# Patient Record
Sex: MALE | Race: OTHER | Employment: OTHER | URBAN - METROPOLITAN AREA
[De-identification: names, ages, dates, MRNs, and addresses within clinical notes are randomized per-mention and may not be internally consistent; named-entity substitution may affect disease eponyms.]

---

## 2023-07-11 LAB
ALANINE AMINOTRANSFERASE (SGPT) (U/L) IN SER/PLAS: 13 U/L (ref 10–52)
ALBUMIN (G/DL) IN SER/PLAS: 4.3 G/DL (ref 3.4–5)
ALKALINE PHOSPHATASE (U/L) IN SER/PLAS: 52 U/L (ref 33–120)
ANION GAP IN SER/PLAS: 12 MMOL/L (ref 10–20)
ASPARTATE AMINOTRANSFERASE (SGOT) (U/L) IN SER/PLAS: 19 U/L (ref 9–39)
BASOPHILS (10*3/UL) IN BLOOD BY MANUAL COUNT - WAM: 0 X10E9/L (ref 0–0.1)
BASOPHILS/100 LEUKOCYTES IN BLOOD BY MANUAL COUNT - WAM: 0 % (ref 0–2)
BILIRUBIN TOTAL (MG/DL) IN SER/PLAS: 0.6 MG/DL (ref 0–1.2)
CALCIDIOL (25 OH VITAMIN D3) (NG/ML) IN SER/PLAS: 30 NG/ML
CALCIUM (MG/DL) IN SER/PLAS: 9.5 MG/DL (ref 8.6–10.6)
CARBON DIOXIDE, TOTAL (MMOL/L) IN SER/PLAS: 27 MMOL/L (ref 21–32)
CD3+CD4+ ABSOLUTE: 0.6 X10E9/L (ref 0.35–2.74)
CD3+CD8+ ABSOLUTE: 0.69 X10E9/L (ref 0.08–1.49)
CD4/CD8 RATIO: 0.87 (ref 1–3.5)
CD45%: 100 %
CHLORIDE (MMOL/L) IN SER/PLAS: 104 MMOL/L (ref 98–107)
CHOLESTEROL (MG/DL) IN SER/PLAS: 202 MG/DL (ref 0–199)
CHOLESTEROL IN HDL (MG/DL) IN SER/PLAS: 40.1 MG/DL
CHOLESTEROL/HDL RATIO: 5
CP CD3+CD4+%: 33 % (ref 29–57)
CP CD3+CD8+%: 38 % (ref 7–31)
CREATININE (MG/DL) IN SER/PLAS: 1.27 MG/DL (ref 0.5–1.3)
EOSINOPHILS (10*3/UL) IN BLOOD BY MANUAL COUNT - WAM: 0.18 X10E9/L (ref 0–0.7)
EOSINOPHILS/100 LEUKOCYTES IN BLOOD BY MANUAL COUNT - WAM: 3.5 % (ref 0–6)
ERYTHROCYTE DISTRIBUTION WIDTH (RATIO) BY AUTOMATED COUNT: 14 % (ref 11.5–14.5)
ERYTHROCYTE MEAN CORPUSCULAR HEMOGLOBIN CONCENTRATION (G/DL) BY AUTOMATED: 33.1 G/DL (ref 32–36)
ERYTHROCYTE MEAN CORPUSCULAR VOLUME (FL) BY AUTOMATED COUNT: 94 FL (ref 80–100)
ERYTHROCYTES (10*6/UL) IN BLOOD BY AUTOMATED COUNT: 4.39 X10E12/L (ref 4.5–5.9)
FMETH: ABNORMAL
FSIT1: ABNORMAL
GFR MALE: 66 ML/MIN/1.73M2
GLUCOSE (MG/DL) IN SER/PLAS: 63 MG/DL (ref 74–99)
HEMATOCRIT (%) IN BLOOD BY AUTOMATED COUNT: 41.4 % (ref 41–52)
HEMOGLOBIN (G/DL) IN BLOOD: 13.7 G/DL (ref 13.5–17.5)
IMMATURE GRANULOCYTES/100 LEUKOCYTES IN BLOOD BY AUTOMATED COUNT: 0 % (ref 0–0.9)
LDL: 147 MG/DL (ref 0–99)
LEUKOCYTES (10*3/UL) IN BLOOD BY AUTOMATED COUNT: 5.1 X10E9/L (ref 4.4–11.3)
LYMPHOCYTES (10*3/UL) IN BLOOD BY MANUAL COUNT - WAM: 1.77 X10E9/L (ref 1.2–4.8)
LYMPHOCYTES VARIANT/100 LEUKOCYTES IN BLOOD - WAM: 0.9 % (ref 0–2)
LYMPHOCYTES/100 LEUKOCYTES IN BLOOD BY MANUAL COUNT - WAM: 34.8 % (ref 13–44)
MANUAL DIFFERENTIAL Y/N: ABNORMAL
MONOCYTES (10*3/UL) IN BLOOD BY MANUAL COUNT - WAM: 0.27 X10E9/L (ref 0.1–1)
MONOCYTES/100 LEUKOCYTES IN BLOOD BY MANUAL COUNT - WAM: 5.2 % (ref 2–10)
NEUTROPHILS (SEGS+BANDS) (10*3/UL) MANUAL COUNT - WAM: 2.84 X10E9/L (ref 1.2–7.7)
NRBC (PER 100 WBCS) BY AUTOMATED COUNT: 0 /100 WBC (ref 0–0)
PLATELET CLUMP (PRESENCE) IN BLOOD BY LIGHT MICROSCOPY: PRESENT
PLATELETS (10*3/UL) IN BLOOD AUTOMATED COUNT: 249 X10E9/L (ref 150–450)
POTASSIUM (MMOL/L) IN SER/PLAS: 4.5 MMOL/L (ref 3.5–5.3)
PROTEIN TOTAL: 7.6 G/DL (ref 6.4–8.2)
RBC MORPHOLOGY IN BLOOD: NORMAL
SEGMENTED NEUTROPHILS (10*3/UL) BLOOD MANUAL - WAM: 2.84 X10E9/L (ref 1.2–7)
SEGMENTED NEUTROPHILS/100 LEUKOCYTES BY MANUAL COUNT -: 55.6 % (ref 40–80)
SODIUM (MMOL/L) IN SER/PLAS: 138 MMOL/L (ref 136–145)
SYPHILIS TOTAL AB: NONREACTIVE
TRIGLYCERIDE (MG/DL) IN SER/PLAS: 74 MG/DL (ref 0–149)
UREA NITROGEN (MG/DL) IN SER/PLAS: 16 MG/DL (ref 6–23)
VARIANT LYMPHOCYTES (10*3/UL) BLOOD MANUAL COUNT - WAM: 0.05 X10E9/L (ref 0–0.5)
VLDL: 15 MG/DL (ref 0–40)

## 2023-07-12 LAB
CHLAMYDIA TRACH., AMPLIFIED: NEGATIVE
HCV PCR QUANT: NOT DETECTED IU/ML
HCV RNA, PCR LOG: NORMAL LOG10 IU/ML
HIV-1 RNA PCR VIRAL LOAD LOG: NORMAL LOG10 CPY/ML
HIV-1 RNA VIRAL LOAD: NOT DETECTED COPIES/ML
N. GONORRHEA, AMPLIFIED: NEGATIVE

## 2023-10-10 ENCOUNTER — TELEPHONE (OUTPATIENT)
Dept: IMMUNOLOGY | Facility: CLINIC | Age: 58
End: 2023-10-10

## 2023-10-10 NOTE — TELEPHONE ENCOUNTER
Sw attempted to reach pt re: proof of tax extension filling needed for [employer] renewal.  Helen LM for pt.

## 2024-01-09 ENCOUNTER — OFFICE VISIT (OUTPATIENT)
Dept: IMMUNOLOGY | Facility: CLINIC | Age: 59
End: 2024-01-09
Payer: MEDICARE

## 2024-01-09 VITALS
DIASTOLIC BLOOD PRESSURE: 94 MMHG | OXYGEN SATURATION: 100 % | HEART RATE: 75 BPM | HEIGHT: 78 IN | TEMPERATURE: 98.1 F | WEIGHT: 240 LBS | SYSTOLIC BLOOD PRESSURE: 146 MMHG | BODY MASS INDEX: 27.77 KG/M2 | RESPIRATION RATE: 16 BRPM

## 2024-01-09 DIAGNOSIS — B20 HIV DISEASE (MULTI): ICD-10-CM

## 2024-01-09 DIAGNOSIS — B20 HUMAN IMMUNODEFICIENCY VIRUS (HIV) DISEASE (MULTI): ICD-10-CM

## 2024-01-09 LAB
ALBUMIN SERPL BCP-MCNC: 4.6 G/DL (ref 3.4–5)
ALP SERPL-CCNC: 60 U/L (ref 33–120)
ALT SERPL W P-5'-P-CCNC: 16 U/L (ref 10–52)
ANION GAP SERPL CALC-SCNC: 14 MMOL/L (ref 10–20)
AST SERPL W P-5'-P-CCNC: 17 U/L (ref 9–39)
BASOPHILS # BLD AUTO: 0.03 X10*3/UL (ref 0–0.1)
BASOPHILS NFR BLD AUTO: 0.5 %
BILIRUB SERPL-MCNC: 0.5 MG/DL (ref 0–1.2)
BUN SERPL-MCNC: 14 MG/DL (ref 6–23)
CALCIUM SERPL-MCNC: 9.4 MG/DL (ref 8.6–10.6)
CD3+CD4+ CELLS # BLD: 0.51 X10E9/L
CD3+CD4+ CELLS # BLD: 513 /MM3
CD3+CD4+ CELLS NFR BLD: 30 %
CD3+CD4+ CELLS/CD3+CD8+ CLL BLD: 0.77 %
CD3+CD8+ CELLS # BLD: 0.67 X10E9/L
CD3+CD8+ CELLS NFR BLD: 39 %
CHLORIDE SERPL-SCNC: 103 MMOL/L (ref 98–107)
CO2 SERPL-SCNC: 28 MMOL/L (ref 21–32)
CREAT SERPL-MCNC: 1.56 MG/DL (ref 0.5–1.3)
EGFRCR SERPLBLD CKD-EPI 2021: 51 ML/MIN/1.73M*2
EOSINOPHIL # BLD AUTO: 0.05 X10*3/UL (ref 0–0.7)
EOSINOPHIL NFR BLD AUTO: 0.9 %
ERYTHROCYTE [DISTWIDTH] IN BLOOD BY AUTOMATED COUNT: 14.4 % (ref 11.5–14.5)
GLUCOSE SERPL-MCNC: 72 MG/DL (ref 74–99)
HCT VFR BLD AUTO: 44.5 % (ref 41–52)
HGB BLD-MCNC: 14.9 G/DL (ref 13.5–17.5)
IMM GRANULOCYTES # BLD AUTO: 0.01 X10*3/UL (ref 0–0.7)
IMM GRANULOCYTES NFR BLD AUTO: 0.2 % (ref 0–0.9)
LYMPHOCYTES # BLD AUTO: 1.71 X10*3/UL (ref 1.2–4.8)
LYMPHOCYTES # SPEC AUTO: 1.71 X10*3/UL
LYMPHOCYTES NFR BLD AUTO: 29.8 %
MCH RBC QN AUTO: 31.2 PG (ref 26–34)
MCHC RBC AUTO-ENTMCNC: 33.5 G/DL (ref 32–36)
MCV RBC AUTO: 93 FL (ref 80–100)
MONOCYTES # BLD AUTO: 0.44 X10*3/UL (ref 0.1–1)
MONOCYTES NFR BLD AUTO: 7.7 %
NEUTROPHILS # BLD AUTO: 3.5 X10*3/UL (ref 1.2–7.7)
NEUTROPHILS NFR BLD AUTO: 60.9 %
NRBC BLD-RTO: 0 /100 WBCS (ref 0–0)
PLATELET # BLD AUTO: 245 X10*3/UL (ref 150–450)
POTASSIUM SERPL-SCNC: 4.1 MMOL/L (ref 3.5–5.3)
PROT SERPL-MCNC: 7.9 G/DL (ref 6.4–8.2)
RBC # BLD AUTO: 4.78 X10*6/UL (ref 4.5–5.9)
SODIUM SERPL-SCNC: 141 MMOL/L (ref 136–145)
WBC # BLD AUTO: 5.7 X10*3/UL (ref 4.4–11.3)

## 2024-01-09 PROCEDURE — 84075 ASSAY ALKALINE PHOSPHATASE: CPT | Performed by: NURSE PRACTITIONER

## 2024-01-09 PROCEDURE — 87536 HIV-1 QUANT&REVRSE TRNSCRPJ: CPT | Performed by: NURSE PRACTITIONER

## 2024-01-09 PROCEDURE — 1036F TOBACCO NON-USER: CPT | Performed by: NURSE PRACTITIONER

## 2024-01-09 PROCEDURE — 88185 FLOWCYTOMETRY/TC ADD-ON: CPT | Mod: TC | Performed by: NURSE PRACTITIONER

## 2024-01-09 PROCEDURE — 99213 OFFICE O/P EST LOW 20 MIN: CPT | Performed by: NURSE PRACTITIONER

## 2024-01-09 PROCEDURE — 36415 COLL VENOUS BLD VENIPUNCTURE: CPT | Performed by: NURSE PRACTITIONER

## 2024-01-09 PROCEDURE — 85025 COMPLETE CBC W/AUTO DIFF WBC: CPT | Performed by: NURSE PRACTITIONER

## 2024-01-09 RX ORDER — GARLIC 1000 MG
CAPSULE ORAL DAILY
COMMUNITY

## 2024-01-09 RX ORDER — RIBOFLAVIN (VITAMIN B2) 100 MG
1 TABLET ORAL DAILY
COMMUNITY
Start: 2020-12-08

## 2024-01-09 RX ORDER — GLUCOSAM/CHONDRO/HERB 149/HYAL 750-100 MG
TABLET ORAL
COMMUNITY

## 2024-01-09 RX ORDER — BICTEGRAVIR SODIUM, EMTRICITABINE, AND TENOFOVIR ALAFENAMIDE FUMARATE 50; 200; 25 MG/1; MG/1; MG/1
1 TABLET ORAL DAILY
COMMUNITY
Start: 2019-06-11 | End: 2024-01-16 | Stop reason: SDUPTHER

## 2024-01-09 ASSESSMENT — ENCOUNTER SYMPTOMS
ENDOCRINE NEGATIVE: 1
ALLERGIC/IMMUNOLOGIC NEGATIVE: 1
RESPIRATORY NEGATIVE: 1
NEUROLOGICAL NEGATIVE: 1
HEMATOLOGIC/LYMPHATIC NEGATIVE: 1
PSYCHIATRIC NEGATIVE: 1
GASTROINTESTINAL NEGATIVE: 1
CARDIOVASCULAR NEGATIVE: 1
BACK PAIN: 1
CONSTITUTIONAL NEGATIVE: 1

## 2024-01-09 ASSESSMENT — PAIN SCALES - GENERAL: PAINLEVEL: 0-NO PAIN

## 2024-01-09 NOTE — PROGRESS NOTES
"HIV Clinic Follow-up Visit:    Esdras Capone was last seen in [facility] on 10/10/2023    Missed antiretroviral doses in last 72 hours? No    Tobacco use: No     SUBJECTIVE: HIV positive patient in for routine follow up.  Blood pressure elevated here this morning.  We discussed salt intake - using Ms. Dash instead.  He plans to make that change today.    Review of Systems  Review of Systems   Constitutional: Negative.    HENT: Negative.     Respiratory: Negative.     Cardiovascular: Negative.    Gastrointestinal: Negative.    Endocrine: Negative.    Genitourinary: Negative.    Musculoskeletal:  Positive for back pain.   Skin: Negative.    Allergic/Immunologic: Negative.    Neurological: Negative.    Hematological: Negative.    Psychiatric/Behavioral: Negative.         CURRENT MEDICATIONS:    Current Outpatient Medications:     Biktarvy -25 mg tablet, Take 1 tablet by mouth once daily., Disp: , Rfl:     omega 3-dha-epa-fish oil (Fish OiL) 1,000 mg (120 mg-180 mg) capsule, Take by mouth., Disp: , Rfl:     riboflavin (vitamin B2) 100 mg tablet tablet, Take 1 tablet (100 mg) by mouth once daily., Disp: , Rfl:     garlic 1,000 mg capsule, Take by mouth once daily., Disp: , Rfl:     PHYSICAL EXAMINATION:  Visit Vitals  BP (!) 146/94 (BP Location: Right arm, Patient Position: Sitting, BP Cuff Size: Adult)   Pulse 75   Temp 36.7 °C (98.1 °F) (Skin)   Resp 16   Ht 1.981 m (6' 6\")   Wt 109 kg (240 lb)   SpO2 100%   BMI 27.73 kg/m²   Smoking Status Never   BSA 2.45 m²       Physical Exam   Physical Exam  Vitals reviewed.   Constitutional:       Appearance: Normal appearance.   HENT:      Head: Normocephalic.   Cardiovascular:      Rate and Rhythm: Normal rate and regular rhythm.      Heart sounds: Normal heart sounds.   Pulmonary:      Effort: Pulmonary effort is normal.      Breath sounds: Normal breath sounds.   Abdominal:      General: Bowel sounds are normal.      Palpations: Abdomen is soft.   Musculoskeletal:         " General: Normal range of motion.      Cervical back: Normal range of motion and neck supple.   Skin:     General: Skin is warm and dry.   Neurological:      Mental Status: He is alert and oriented to person, place, and time.   Psychiatric:         Mood and Affect: Mood normal.       PERTINENT DATA:  CBC:  WBC   Date Value Ref Range Status   07/11/2023 5.1 4.4 - 11.3 x10E9/L Final     nRBC   Date Value Ref Range Status   07/11/2023 0.0 0.0 - 0.0 /100 WBC Final     RBC   Date Value Ref Range Status   07/11/2023 4.39 (L) 4.50 - 5.90 x10E12/L Final     Hemoglobin   Date Value Ref Range Status   07/11/2023 13.7 13.5 - 17.5 g/dL Final     MCV   Date Value Ref Range Status   07/11/2023 94 80 - 100 fL Final     RDW   Date Value Ref Range Status   07/11/2023 14.0 11.5 - 14.5 % Final       Renal Function Panel:  Glucose   Date Value Ref Range Status   07/11/2023 63 (L) 74 - 99 mg/dL Final     Sodium   Date Value Ref Range Status   07/11/2023 138 136 - 145 mmol/L Final     Potassium   Date Value Ref Range Status   07/11/2023 4.5 3.5 - 5.3 mmol/L Final     Comment:     MILD HEMOLYSIS DETECTED. The result may be falsely elevated due to  hemolysis or other interferents. Clinical correlation is recommended.  Repeat testing may be considered.       Chloride   Date Value Ref Range Status   07/11/2023 104 98 - 107 mmol/L Final     Anion Gap   Date Value Ref Range Status   07/11/2023 12 10 - 20 mmol/L Final     Urea Nitrogen   Date Value Ref Range Status   07/11/2023 16 6 - 23 mg/dL Final     Creatinine   Date Value Ref Range Status   07/11/2023 1.27 0.50 - 1.30 mg/dL Final     Calcium   Date Value Ref Range Status   07/11/2023 9.5 8.6 - 10.6 mg/dL Final     Phosphorus   Date Value Ref Range Status   12/30/2022 2.5 2.5 - 4.9 mg/dL Final     Comment:      The performance characteristics of phosphorus testing in   heparinized plasma have been validated by the individual     laboratory site where testing is performed. Testing    on  "heparinized plasma is not approved by the FDA;    however, such approval is not necessary.       Albumin   Date Value Ref Range Status   07/11/2023 4.3 3.4 - 5.0 g/dL Final       Hepatic Panel:  Albumin   Date Value Ref Range Status   07/11/2023 4.3 3.4 - 5.0 g/dL Final     Total Bilirubin   Date Value Ref Range Status   07/11/2023 0.6 0.0 - 1.2 mg/dL Final     Alkaline Phosphatase   Date Value Ref Range Status   07/11/2023 52 33 - 120 U/L Final     ALT (SGPT)   Date Value Ref Range Status   07/11/2023 13 10 - 52 U/L Final     Comment:      Patients treated with Sulfasalazine may generate    falsely decreased results for ALT.         HIV Viral Load:  No results found for: \"[id number]\", \"HIVRNAPCR\"    CD4 Count:  CD3+CD4+%   Date Value Ref Range Status   07/11/2023 33 29 - 57 % Final     CD3+CD4+ Absolute   Date Value Ref Range Status   07/11/2023 0.601 0.350 - 2.740 x10E9/L Final     CD3+CD8+%   Date Value Ref Range Status   07/11/2023 38 (H) 7 - 31 % Final     CD3+CD8+ Absolute   Date Value Ref Range Status   07/11/2023 0.692 0.080 - 1.490 x10E9/L Final     CD4/CD8 Ratio   Date Value Ref Range Status   07/11/2023 0.87 (L) 1.00 - 3.50 Final     CD45%   Date Value Ref Range Status   07/11/2023 100 % Final       CRCL:  Creatinine, Urine   Date Value Ref Range Status   10/10/2022 356.0 20.0 - 370.0 mg/dL Final       Lipid Panel:  HDL   Date Value Ref Range Status   07/11/2023 40.1 mg/dL Final     Comment:     .      AGE      VERY LOW   LOW     NORMAL    HIGH       0-19 Y       < 35   < 40     40-45     ----    20-24 Y       ----   < 40       >45     ----      >24 Y       ----   < 40     40-60      >60  .       Cholesterol/HDL Ratio   Date Value Ref Range Status   07/11/2023 5.0  Final     Comment:     REF VALUES  DESIRABLE  < 3.4  HIGH RISK  > 5.0       LDL   Date Value Ref Range Status   07/11/2023 147 (H) 0 - 99 mg/dL Final     Comment:     .                           NEAR      BORD      AGE      DESIRABLE  OPTIMAL   " " HIGH     HIGH     VERY HIGH     0-19 Y     0 - 109     ---    110-129   >/= 130     ----    20-24 Y     0 - 119     ---    120-159   >/= 160     ----      >24 Y     0 -  99   100-129  130-159   160-189     >/=190  .       VLDL   Date Value Ref Range Status   07/11/2023 15 0 - 40 mg/dL Final     Triglycerides   Date Value Ref Range Status   07/11/2023 74 0 - 149 mg/dL Final     Comment:     .      AGE      DESIRABLE   BORDERLINE HIGH   HIGH     VERY HIGH   0 D-90 D    19 - 174         ----         ----        ----  91 D- 9 Y     0 -  74        75 -  99     >/= 100      ----    10-19 Y     0 -  89        90 - 129     >/= 130      ----    20-24 Y     0 - 114       115 - 149     >/= 150      ----         >24 Y     0 - 149       150 - 199    200- 499    >/= 500  .   Venipuncture immediately after or during the    administration of Metamizole may lead to falsely   low results. Testing should be performed immediately   prior to Metamizole dosing.         HgbA1c:  No results found for: \"HGBA1C\"    The 10-year ASCVD risk score (Sohail DK, et al., 2019) is: 10.1%    Values used to calculate the score:      Age: 58 years      Sex: Male      Is Non- : No      Diabetic: No      Tobacco smoker: No      Systolic Blood Pressure: 146 mmHg      Is BP treated: No      HDL Cholesterol: 40.1 mg/dL      Total Cholesterol: 202 mg/dL    ASSESSMENT / PLAN:  Getting routine labs today.   Plan for him to take his blood pressures at home daily - at approximately the same time.  Keep a log.  We will contact him in a month to see what his blood pressures have been.   He intends to delete salt (added) from his diet to try to control his BP.   Continue current regimen - last viral load remains undetectable; and t-cells at 600.  Problem List Items Addressed This Visit    None  Visit Diagnoses       HIV disease (CMS/HCC)        Relevant Orders    CBC and Auto Differential    CD4/8 Panel    HIV RNA, quantitative, PCR    Human " immunodeficiency virus (HIV) disease (CMS/HCC)        Relevant Orders    Comprehensive Metabolic Panel        Thank you for coming in today.   Your plan to eliminate added salt is a good one.   Please take your blood pressure daily; and I will follow up in a few weeks to see what those numbers are.   Please call us with any questions.     Candy Rice, ASHTYN-CNP

## 2024-01-10 LAB
HIV1 RNA # PLAS NAA DL=20: 110 COPIES/ML
HIV1 RNA # PLAS NAA DL=20: DETECTED {COPIES}/ML
HIV1 RNA SPEC NAA+PROBE-LOG#: 2.04 LOG10 CPY/ML

## 2024-01-15 DIAGNOSIS — B20 HIV INFECTION, UNSPECIFIED SYMPTOM STATUS (MULTI): ICD-10-CM

## 2024-01-16 DIAGNOSIS — B20 HIV DISEASE (MULTI): Primary | ICD-10-CM

## 2024-01-16 RX ORDER — BICTEGRAVIR SODIUM, EMTRICITABINE, AND TENOFOVIR ALAFENAMIDE FUMARATE 50; 200; 25 MG/1; MG/1; MG/1
1 TABLET ORAL DAILY
Qty: 30 TABLET | Refills: 5 | Status: SHIPPED | OUTPATIENT
Start: 2024-01-16 | End: 2024-02-21 | Stop reason: SDUPTHER

## 2024-02-08 ENCOUNTER — TELEPHONE (OUTPATIENT)
Dept: IMMUNOLOGY | Facility: CLINIC | Age: 59
End: 2024-02-08
Payer: MEDICARE

## 2024-02-08 NOTE — TELEPHONE ENCOUNTER
Spoke with Esdras today about his log of blood pressure readings from home:    They have run with systolic from a high of 116 to a low of 93.   Diastolic from mostly 70s to an occasional 90.    He is trying to use Ms Dash products to season his food.    We will cancel his appointment in March - and make it for in 6 months instead.    He is planning to travel to [place] in the summer - encouraged him to call the travel clinic once he knows where he is going.   He will follow up on that.

## 2024-02-21 DIAGNOSIS — B20 HIV DISEASE (MULTI): ICD-10-CM

## 2024-02-21 RX ORDER — BICTEGRAVIR SODIUM, EMTRICITABINE, AND TENOFOVIR ALAFENAMIDE FUMARATE 50; 200; 25 MG/1; MG/1; MG/1
1 TABLET ORAL DAILY
Qty: 90 TABLET | Refills: 1 | Status: SHIPPED | OUTPATIENT
Start: 2024-02-21 | End: 2024-05-21

## 2024-03-15 ENCOUNTER — APPOINTMENT (OUTPATIENT)
Dept: IMMUNOLOGY | Facility: CLINIC | Age: 59
End: 2024-03-15
Payer: MEDICARE

## 2024-04-23 ENCOUNTER — TELEPHONE (OUTPATIENT)
Dept: INFECTIOUS DISEASES | Facility: HOSPITAL | Age: 59
End: 2024-04-23

## 2024-04-23 NOTE — TELEPHONE ENCOUNTER
4/2 - criselda spoke with pt re: DEBBIE renewal and tax transcript needed.  Pt will send.    4/23 - criselda Lm for pt re: DEBBIE.

## 2024-07-09 ENCOUNTER — OFFICE VISIT (OUTPATIENT)
Dept: IMMUNOLOGY | Facility: CLINIC | Age: 59
End: 2024-07-09
Payer: MEDICARE

## 2024-07-09 VITALS
RESPIRATION RATE: 18 BRPM | WEIGHT: 231.2 LBS | HEART RATE: 75 BPM | HEIGHT: 73 IN | SYSTOLIC BLOOD PRESSURE: 127 MMHG | TEMPERATURE: 97.9 F | OXYGEN SATURATION: 100 % | BODY MASS INDEX: 30.64 KG/M2 | DIASTOLIC BLOOD PRESSURE: 90 MMHG

## 2024-07-09 DIAGNOSIS — Z77.21 PERSONAL HISTORY OF EXPOSURE TO POTENTIALLY HAZARDOUS BODY FLUIDS: Primary | ICD-10-CM

## 2024-07-09 DIAGNOSIS — B20 HUMAN IMMUNODEFICIENCY VIRUS (HIV) DISEASE (MULTI): ICD-10-CM

## 2024-07-09 LAB
ALBUMIN SERPL BCP-MCNC: 4.6 G/DL (ref 3.4–5)
ALP SERPL-CCNC: 51 U/L (ref 33–120)
ALT SERPL W P-5'-P-CCNC: 16 U/L (ref 10–52)
ANION GAP SERPL CALC-SCNC: 13 MMOL/L (ref 10–20)
AST SERPL W P-5'-P-CCNC: 23 U/L (ref 9–39)
BASOPHILS # BLD AUTO: 0.04 X10*3/UL (ref 0–0.1)
BASOPHILS NFR BLD AUTO: 0.8 %
BILIRUB SERPL-MCNC: 0.4 MG/DL (ref 0–1.2)
BUN SERPL-MCNC: 16 MG/DL (ref 6–23)
CALCIUM SERPL-MCNC: 9.5 MG/DL (ref 8.6–10.6)
CD3+CD4+ CELLS # BLD: 0.5 X10E9/L
CD3+CD4+ CELLS # BLD: 502 /MM3
CD3+CD4+ CELLS NFR BLD: 32 %
CD3+CD4+ CELLS/CD3+CD8+ CLL BLD: 0.84 %
CD3+CD8+ CELLS # BLD: 0.6 X10E9/L
CD3+CD8+ CELLS NFR BLD: 38 %
CHLORIDE SERPL-SCNC: 103 MMOL/L (ref 98–107)
CO2 SERPL-SCNC: 27 MMOL/L (ref 21–32)
CREAT SERPL-MCNC: 1.62 MG/DL (ref 0.5–1.3)
EGFRCR SERPLBLD CKD-EPI 2021: 49 ML/MIN/1.73M*2
EOSINOPHIL # BLD AUTO: 0.03 X10*3/UL (ref 0–0.7)
EOSINOPHIL NFR BLD AUTO: 0.6 %
ERYTHROCYTE [DISTWIDTH] IN BLOOD BY AUTOMATED COUNT: 14.3 % (ref 11.5–14.5)
GLUCOSE SERPL-MCNC: 81 MG/DL (ref 74–99)
HCT VFR BLD AUTO: 41 % (ref 41–52)
HGB BLD-MCNC: 13.5 G/DL (ref 13.5–17.5)
IMM GRANULOCYTES # BLD AUTO: 0.02 X10*3/UL (ref 0–0.7)
IMM GRANULOCYTES NFR BLD AUTO: 0.4 % (ref 0–0.9)
LYMPHOCYTES # BLD AUTO: 1.57 X10*3/UL (ref 1.2–4.8)
LYMPHOCYTES # SPEC AUTO: 1.57 X10*3/UL
LYMPHOCYTES NFR BLD AUTO: 32.8 %
MCH RBC QN AUTO: 30.8 PG (ref 26–34)
MCHC RBC AUTO-ENTMCNC: 32.9 G/DL (ref 32–36)
MCV RBC AUTO: 94 FL (ref 80–100)
MONOCYTES # BLD AUTO: 0.45 X10*3/UL (ref 0.1–1)
MONOCYTES NFR BLD AUTO: 9.4 %
NEUTROPHILS # BLD AUTO: 2.68 X10*3/UL (ref 1.2–7.7)
NEUTROPHILS NFR BLD AUTO: 56 %
NRBC BLD-RTO: 0 /100 WBCS (ref 0–0)
PLATELET # BLD AUTO: 259 X10*3/UL (ref 150–450)
POTASSIUM SERPL-SCNC: 3.9 MMOL/L (ref 3.5–5.3)
PROT SERPL-MCNC: 7.6 G/DL (ref 6.4–8.2)
RBC # BLD AUTO: 4.38 X10*6/UL (ref 4.5–5.9)
SODIUM SERPL-SCNC: 139 MMOL/L (ref 136–145)
WBC # BLD AUTO: 4.8 X10*3/UL (ref 4.4–11.3)

## 2024-07-09 PROCEDURE — 87536 HIV-1 QUANT&REVRSE TRNSCRPJ: CPT | Performed by: NURSE PRACTITIONER

## 2024-07-09 PROCEDURE — 88185 FLOWCYTOMETRY/TC ADD-ON: CPT | Mod: TC | Performed by: NURSE PRACTITIONER

## 2024-07-09 PROCEDURE — 36415 COLL VENOUS BLD VENIPUNCTURE: CPT | Performed by: NURSE PRACTITIONER

## 2024-07-09 PROCEDURE — 85025 COMPLETE CBC W/AUTO DIFF WBC: CPT | Performed by: NURSE PRACTITIONER

## 2024-07-09 PROCEDURE — 1036F TOBACCO NON-USER: CPT | Performed by: NURSE PRACTITIONER

## 2024-07-09 PROCEDURE — 99214 OFFICE O/P EST MOD 30 MIN: CPT | Performed by: NURSE PRACTITIONER

## 2024-07-09 PROCEDURE — 80053 COMPREHEN METABOLIC PANEL: CPT | Performed by: NURSE PRACTITIONER

## 2024-07-09 RX ORDER — DOXYCYCLINE 100 MG/1
200 CAPSULE ORAL AS NEEDED
Qty: 30 CAPSULE | Refills: 0 | Status: SHIPPED | OUTPATIENT
Start: 2024-07-09

## 2024-07-09 ASSESSMENT — PAIN SCALES - GENERAL: PAINLEVEL: 9

## 2024-07-09 ASSESSMENT — ENCOUNTER SYMPTOMS
ALLERGIC/IMMUNOLOGIC NEGATIVE: 1
HEMATOLOGIC/LYMPHATIC NEGATIVE: 1
EYES NEGATIVE: 1
NEUROLOGICAL NEGATIVE: 1
BACK PAIN: 1
ENDOCRINE NEGATIVE: 1
CARDIOVASCULAR NEGATIVE: 1
GASTROINTESTINAL NEGATIVE: 1
RESPIRATORY NEGATIVE: 1

## 2024-07-09 NOTE — PROGRESS NOTES
"HIV Clinic Follow-up Visit:    Esdras Capone was last seen in [facility] on 2/8/2024    Missed antiretroviral doses in last 72 hours? No    Sexually active? no,       Tobacco use: No    Does not use street drugs.    SUBJECTIVE: 59 YO Male in for routine follow up.  Is having some back pain - thinks he strained something when he was exercising.   Wearing a back brace at present.      Review of Systems  Review of Systems   Constitutional:         He believes that he has a fever every night - especially if he has a fan on or AC on.  States his mother experiences the same.    HENT: Negative.     Eyes: Negative.    Respiratory: Negative.     Cardiovascular: Negative.    Gastrointestinal: Negative.    Endocrine: Negative.    Genitourinary: Negative.    Musculoskeletal:  Positive for back pain.        He does have some herniated disks and he believes he strained/sprained his back while exercising lately.   Currently wearing a back brace and he has a TENS unit.    Skin: Negative.    Allergic/Immunologic: Negative.    Neurological: Negative.    Hematological: Negative.    Psychiatric/Behavioral:          He does have some depression - and has used a therapist in the past.   Now he can manage this pretty much on his own -he pushes it away.   He denies any SI.         CURRENT MEDICATIONS:    Current Outpatient Medications:     riboflavin (vitamin B2) 100 mg tablet tablet, Take 1 tablet (100 mg) by mouth once daily., Disp: , Rfl:     egnjzkrgg-ugwgzzbq-eeqvfoc ala (Biktarvy) -25 mg tablet, Take by mouth once daily., Disp: , Rfl:     garlic 1,000 mg capsule, Take by mouth once daily., Disp: , Rfl:     omega 3-dha-epa-fish oil (Fish OiL) 1,000 mg (120 mg-180 mg) capsule, Take by mouth., Disp: , Rfl:     PHYSICAL EXAMINATION:  Visit Vitals  /90 (BP Location: Left arm, Patient Position: Lying, BP Cuff Size: Large adult)   Pulse 75   Temp 36.6 °C (97.9 °F) (Temporal)   Resp 18   Ht 1.842 m (6' 0.5\")   Wt 105 kg (231 lb 3.2 " oz)   SpO2 100%   BMI 30.93 kg/m²   Smoking Status Never   BSA 2.32 m²       Physical Exam   Physical Exam  Vitals reviewed.   Constitutional:       Appearance: Normal appearance.   HENT:      Head: Normocephalic.   Cardiovascular:      Rate and Rhythm: Normal rate and regular rhythm.      Heart sounds: Normal heart sounds.   Pulmonary:      Effort: Pulmonary effort is normal.      Breath sounds: Normal breath sounds.   Abdominal:      General: Bowel sounds are normal.      Palpations: Abdomen is soft.   Musculoskeletal:         General: Normal range of motion.      Cervical back: Normal range of motion and neck supple.      Comments: Wearing a back brace at this time.    Skin:     General: Skin is warm and dry.   Neurological:      Mental Status: He is alert and oriented to person, place, and time.   Psychiatric:         Mood and Affect: Mood normal.         Behavior: Behavior normal.      Comments: He does admit to some depression but states he is managing on his own fairly well.   He knows when to ask for help.          PERTINENT DATA:  CBC:  WBC   Date Value Ref Range Status   07/09/2024 4.8 4.4 - 11.3 x10*3/uL Final     nRBC   Date Value Ref Range Status   07/09/2024 0.0 0.0 - 0.0 /100 WBCs Final     RBC   Date Value Ref Range Status   07/09/2024 4.38 (L) 4.50 - 5.90 x10*6/uL Final     Hemoglobin   Date Value Ref Range Status   07/09/2024 13.5 13.5 - 17.5 g/dL Final     MCV   Date Value Ref Range Status   07/09/2024 94 80 - 100 fL Final     RDW   Date Value Ref Range Status   07/09/2024 14.3 11.5 - 14.5 % Final       Renal Function Panel:  Glucose   Date Value Ref Range Status   07/09/2024 81 74 - 99 mg/dL Final     Sodium   Date Value Ref Range Status   07/09/2024 139 136 - 145 mmol/L Final     Potassium   Date Value Ref Range Status   07/09/2024 3.9 3.5 - 5.3 mmol/L Final     Chloride   Date Value Ref Range Status   07/09/2024 103 98 - 107 mmol/L Final     Anion Gap   Date Value Ref Range Status   07/09/2024  13 10 - 20 mmol/L Final     Urea Nitrogen   Date Value Ref Range Status   07/09/2024 16 6 - 23 mg/dL Final     Creatinine   Date Value Ref Range Status   07/09/2024 1.62 (H) 0.50 - 1.30 mg/dL Final     eGFR   Date Value Ref Range Status   07/09/2024 49 (L) >60 mL/min/1.73m*2 Final     Comment:     Calculations of estimated GFR are performed using the 2021 CKD-EPI Study Refit equation without the race variable for the IDMS-Traceable creatinine methods.  https://jasn.asnjournals.org/content/early/2021/09/22/ASN.9556687777     Calcium   Date Value Ref Range Status   07/09/2024 9.5 8.6 - 10.6 mg/dL Final     Phosphorus   Date Value Ref Range Status   12/30/2022 2.5 2.5 - 4.9 mg/dL Final     Comment:      The performance characteristics of phosphorus testing in   heparinized plasma have been validated by the individual     laboratory site where testing is performed. Testing    on heparinized plasma is not approved by the FDA;    however, such approval is not necessary.       Albumin   Date Value Ref Range Status   07/09/2024 4.6 3.4 - 5.0 g/dL Final       Hepatic Panel:  Albumin   Date Value Ref Range Status   07/09/2024 4.6 3.4 - 5.0 g/dL Final     Bilirubin, Total   Date Value Ref Range Status   07/09/2024 0.4 0.0 - 1.2 mg/dL Final     Alkaline Phosphatase   Date Value Ref Range Status   07/09/2024 51 33 - 120 U/L Final     ALT   Date Value Ref Range Status   07/09/2024 16 10 - 52 U/L Final     Comment:     Patients treated with Sulfasalazine may generate falsely decreased results for ALT.       HIV Viral Load:  HIV-1 RNA PCR Viral Load Log   Date Value Ref Range Status   01/09/2024 2.04 log10 cpy/mL Final     HIV RNA Result   Date Value Ref Range Status   01/09/2024 Detected (A) Not Detected Final       CD4 Count:  CD3+CD4+%   Date Value Ref Range Status   01/09/2024 30 29 - 57 % Final     CD3+CD4+ Absolute   Date Value Ref Range Status   01/09/2024 0.513 0.350 - 2.740 x10E9/L Final     CD3+CD8+%   Date Value Ref  Range Status   01/09/2024 39 (H) 7 - 31 % Final     CD3+CD8+ Absolute   Date Value Ref Range Status   01/09/2024 0.667 0.080 - 1.490 x10E9/L Final     CD4/CD8 Ratio   Date Value Ref Range Status   01/09/2024 0.77 (L) 1.00 - 2.60 Final     CD45%   Date Value Ref Range Status   07/11/2023 100 % Final       CRCL:  Creatinine, Urine   Date Value Ref Range Status   10/10/2022 356.0 20.0 - 370.0 mg/dL Final       Lipid Panel:  HDL   Date Value Ref Range Status   07/11/2023 40.1 mg/dL Final     Comment:     .      AGE      VERY LOW   LOW     NORMAL    HIGH       0-19 Y       < 35   < 40     40-45     ----    20-24 Y       ----   < 40       >45     ----      >24 Y       ----   < 40     40-60      >60  .       Cholesterol/HDL Ratio   Date Value Ref Range Status   07/11/2023 5.0  Final     Comment:     REF VALUES  DESIRABLE  < 3.4  HIGH RISK  > 5.0       LDL   Date Value Ref Range Status   07/11/2023 147 (H) 0 - 99 mg/dL Final     Comment:     .                           NEAR      BORD      AGE      DESIRABLE  OPTIMAL    HIGH     HIGH     VERY HIGH     0-19 Y     0 - 109     ---    110-129   >/= 130     ----    20-24 Y     0 - 119     ---    120-159   >/= 160     ----      >24 Y     0 -  99   100-129  130-159   160-189     >/=190  .       VLDL   Date Value Ref Range Status   07/11/2023 15 0 - 40 mg/dL Final     Triglycerides   Date Value Ref Range Status   07/11/2023 74 0 - 149 mg/dL Final     Comment:     .      AGE      DESIRABLE   BORDERLINE HIGH   HIGH     VERY HIGH   0 D-90 D    19 - 174         ----         ----        ----  91 D- 9 Y     0 -  74        75 -  99     >/= 100      ----    10-19 Y     0 -  89        90 - 129     >/= 130      ----    20-24 Y     0 - 114       115 - 149     >/= 150      ----         >24 Y     0 - 149       150 - 199    200- 499    >/= 500  .   Venipuncture immediately after or during the    administration of Metamizole may lead to falsely   low results. Testing should be performed  "immediately   prior to Metamizole dosing.         HgbA1c:  No results found for: \"HGBA1C\"    The 10-year ASCVD risk score (Sohail DK, et al., 2019) is: 8%    Values used to calculate the score:      Age: 58 years      Sex: Male      Is Non- : No      Diabetic: No      Tobacco smoker: No      Systolic Blood Pressure: 127 mmHg      Is BP treated: No      HDL Cholesterol: 40.1 mg/dL      Total Cholesterol: 202 mg/dL    ASSESSMENT / PLAN:  Routine labs today.   We discussed mpoxx - he declines at present.  Talked about doxy pep - will order for him  Believes he is up to day on covid vaccines.  We did talk about heat health.     See him again in 6 months.   Problem List Items Addressed This Visit    None  Visit Diagnoses       Human immunodeficiency virus (HIV) disease (Multi)        Relevant Orders    CBC and Auto Differential    CD4/8 Panel    Comprehensive Metabolic Panel (Completed)    HIV RNA, quantitative, PCR        Thanks for coming in to see us today,   We have ordered doxy pep for you.  Stay safe this summer.   We will see you back in 6 months.  Please call if you have any questions or concerns.     Candy Rice, APRN-CNP      "

## 2024-07-10 LAB
HIV1 RNA # PLAS NAA DL=20: NOT DETECTED {COPIES}/ML
HIV1 RNA SPEC NAA+PROBE-LOG#: NORMAL {LOG_COPIES}/ML

## 2024-07-16 DIAGNOSIS — B20 HIV DISEASE (MULTI): Primary | ICD-10-CM

## 2024-07-22 ENCOUNTER — DOCUMENTATION (OUTPATIENT)
Dept: IMMUNOLOGY | Facility: CLINIC | Age: 59
End: 2024-07-22
Payer: MEDICARE

## 2024-07-22 NOTE — PROGRESS NOTES
As requested by Mr. Capone, I called in the doxy Pep to [facility] at [phone].      They accepted the script, and will be calling him to verify his home address.

## 2024-12-03 DIAGNOSIS — B20 HIV DISEASE (MULTI): ICD-10-CM

## 2024-12-03 RX ORDER — BICTEGRAVIR SODIUM, EMTRICITABINE, AND TENOFOVIR ALAFENAMIDE FUMARATE 50; 200; 25 MG/1; MG/1; MG/1
1 TABLET ORAL DAILY
Qty: 30 TABLET | Refills: 5 | Status: SHIPPED | OUTPATIENT
Start: 2024-12-03

## 2025-01-07 ENCOUNTER — NUTRITION (OUTPATIENT)
Dept: IMMUNOLOGY | Facility: CLINIC | Age: 60
End: 2025-01-07

## 2025-01-07 ENCOUNTER — OFFICE VISIT (OUTPATIENT)
Dept: IMMUNOLOGY | Facility: CLINIC | Age: 60
End: 2025-01-07
Payer: MEDICARE

## 2025-01-07 VITALS
DIASTOLIC BLOOD PRESSURE: 90 MMHG | OXYGEN SATURATION: 100 % | RESPIRATION RATE: 16 BRPM | SYSTOLIC BLOOD PRESSURE: 154 MMHG | BODY MASS INDEX: 28.46 KG/M2 | WEIGHT: 246 LBS | TEMPERATURE: 97.9 F | HEART RATE: 76 BPM | HEIGHT: 78 IN

## 2025-01-07 DIAGNOSIS — B20 HUMAN IMMUNODEFICIENCY VIRUS (HIV) DISEASE (MULTI): ICD-10-CM

## 2025-01-07 LAB
ALBUMIN SERPL BCP-MCNC: 4.3 G/DL (ref 3.4–5)
ALP SERPL-CCNC: 56 U/L (ref 33–120)
ALT SERPL W P-5'-P-CCNC: 16 U/L (ref 10–52)
ANION GAP SERPL CALC-SCNC: 11 MMOL/L (ref 10–20)
AST SERPL W P-5'-P-CCNC: 16 U/L (ref 9–39)
BASOPHILS # BLD AUTO: 0.02 X10*3/UL (ref 0–0.1)
BASOPHILS NFR BLD AUTO: 0.3 %
BILIRUB SERPL-MCNC: 0.5 MG/DL (ref 0–1.2)
BUN SERPL-MCNC: 11 MG/DL (ref 6–23)
CALCIUM SERPL-MCNC: 9.5 MG/DL (ref 8.6–10.6)
CHLORIDE SERPL-SCNC: 107 MMOL/L (ref 98–107)
CO2 SERPL-SCNC: 28 MMOL/L (ref 21–32)
CREAT SERPL-MCNC: 1.56 MG/DL (ref 0.5–1.3)
EGFRCR SERPLBLD CKD-EPI 2021: 51 ML/MIN/1.73M*2
EOSINOPHIL # BLD AUTO: 0.07 X10*3/UL (ref 0–0.7)
EOSINOPHIL NFR BLD AUTO: 1.2 %
ERYTHROCYTE [DISTWIDTH] IN BLOOD BY AUTOMATED COUNT: 14.1 % (ref 11.5–14.5)
GLUCOSE SERPL-MCNC: 77 MG/DL (ref 74–99)
HCT VFR BLD AUTO: 43.6 % (ref 41–52)
HGB BLD-MCNC: 14.4 G/DL (ref 13.5–17.5)
IMM GRANULOCYTES # BLD AUTO: 0.02 X10*3/UL (ref 0–0.7)
IMM GRANULOCYTES NFR BLD AUTO: 0.3 % (ref 0–0.9)
LYMPHOCYTES # BLD AUTO: 1.97 X10*3/UL (ref 1.2–4.8)
LYMPHOCYTES NFR BLD AUTO: 33.4 %
MCH RBC QN AUTO: 30.9 PG (ref 26–34)
MCHC RBC AUTO-ENTMCNC: 33 G/DL (ref 32–36)
MCV RBC AUTO: 94 FL (ref 80–100)
MONOCYTES # BLD AUTO: 0.6 X10*3/UL (ref 0.1–1)
MONOCYTES NFR BLD AUTO: 10.2 %
NEUTROPHILS # BLD AUTO: 3.22 X10*3/UL (ref 1.2–7.7)
NEUTROPHILS NFR BLD AUTO: 54.6 %
NRBC BLD-RTO: 0 /100 WBCS (ref 0–0)
PLATELET # BLD AUTO: 241 X10*3/UL (ref 150–450)
POTASSIUM SERPL-SCNC: 4.3 MMOL/L (ref 3.5–5.3)
PROT SERPL-MCNC: 7.3 G/DL (ref 6.4–8.2)
RBC # BLD AUTO: 4.66 X10*6/UL (ref 4.5–5.9)
SODIUM SERPL-SCNC: 142 MMOL/L (ref 136–145)
TREPONEMA PALLIDUM IGG+IGM AB [PRESENCE] IN SERUM OR PLASMA BY IMMUNOASSAY: NONREACTIVE
WBC # BLD AUTO: 5.9 X10*3/UL (ref 4.4–11.3)

## 2025-01-07 PROCEDURE — 88185 FLOWCYTOMETRY/TC ADD-ON: CPT | Performed by: NURSE PRACTITIONER

## 2025-01-07 PROCEDURE — 85025 COMPLETE CBC W/AUTO DIFF WBC: CPT | Performed by: NURSE PRACTITIONER

## 2025-01-07 PROCEDURE — 1036F TOBACCO NON-USER: CPT | Performed by: NURSE PRACTITIONER

## 2025-01-07 PROCEDURE — 87536 HIV-1 QUANT&REVRSE TRNSCRPJ: CPT | Performed by: NURSE PRACTITIONER

## 2025-01-07 PROCEDURE — 3008F BODY MASS INDEX DOCD: CPT | Performed by: NURSE PRACTITIONER

## 2025-01-07 PROCEDURE — 99215 OFFICE O/P EST HI 40 MIN: CPT | Performed by: NURSE PRACTITIONER

## 2025-01-07 PROCEDURE — 36415 COLL VENOUS BLD VENIPUNCTURE: CPT | Performed by: NURSE PRACTITIONER

## 2025-01-07 PROCEDURE — 86780 TREPONEMA PALLIDUM: CPT | Performed by: NURSE PRACTITIONER

## 2025-01-07 PROCEDURE — 80053 COMPREHEN METABOLIC PANEL: CPT | Performed by: NURSE PRACTITIONER

## 2025-01-07 ASSESSMENT — ENCOUNTER SYMPTOMS
HEMATOLOGIC/LYMPHATIC NEGATIVE: 1
ALLERGIC/IMMUNOLOGIC NEGATIVE: 1
ENDOCRINE NEGATIVE: 1
RESPIRATORY NEGATIVE: 1
CONSTITUTIONAL NEGATIVE: 1
GASTROINTESTINAL NEGATIVE: 1
NEUROLOGICAL NEGATIVE: 1
CARDIOVASCULAR NEGATIVE: 1

## 2025-01-07 ASSESSMENT — PAIN SCALES - GENERAL: PAINLEVEL_OUTOF10: 0-NO PAIN

## 2025-01-07 NOTE — PROGRESS NOTES
HIV Clinic Follow-up Visit:    Esdras Capone was last seen in [facility] on 7/9/2024    Missed antiretroviral doses in last 72 hours? No    Sexually active? no,   Tobacco use: No    No alcohol, no drugs.  Not currently sexually active.     SUBJECTIVE: 58 YO male in for routine follow up.  Feels well today.  Not inclined to get the flu shot or the covid shot.         Review of Systems  Review of Systems   Constitutional: Negative.    HENT: Negative.     Eyes:         He thinks it has been about 2 years since he had an eye exam - will get into have his eyes checked.    Respiratory: Negative.     Cardiovascular: Negative.    Gastrointestinal: Negative.    Endocrine: Negative.    Genitourinary: Negative.    Musculoskeletal:         Occasional upper back pain -but he know what triggers it and tries very hard to keep it happy.    Skin: Negative.    Allergic/Immunologic: Negative.    Neurological: Negative.    Hematological: Negative.    Psychiatric/Behavioral:          He will have occasional issues with anxiety and/or depression -but he is able to get out of  his head and take care of it.  Reminded him to call if he needs help.        CURRENT MEDICATIONS:    Current Outpatient Medications:     Biktarvy -25 mg tablet, Take 1 tablet by mouth once daily., Disp: 30 tablet, Rfl: 5    doxycycline (Vibramycin) 100 mg capsule, Take 2 capsules (200 mg) by mouth if needed (Take 2 pills 1 time within 3 days of an unprotected sexual encounter) for up to 15 doses. Take with at least 8 ounces (large glass) of water, do not lie down for 30 minutes after, Disp: 30 capsule, Rfl: 0    garlic 1,000 mg capsule, Take by mouth once daily., Disp: , Rfl:     omega 3-dha-epa-fish oil (Fish OiL) 1,000 mg (120 mg-180 mg) capsule, Take by mouth., Disp: , Rfl:     riboflavin (vitamin B2) 100 mg tablet tablet, Take 1 tablet (100 mg) by mouth once daily., Disp: , Rfl:     PHYSICAL EXAMINATION:  Visit Vitals  /90 (BP Location: Left arm, Patient  "Position: Sitting, BP Cuff Size: Adult)   Pulse 76   Temp 36.6 °C (97.9 °F) (Skin)   Resp 16   Ht 1.981 m (6' 6\")   Wt 112 kg (246 lb)   SpO2 100%   BMI 28.43 kg/m²   Smoking Status Never   BSA 2.48 m²       Physical Exam   Physical Exam  Vitals reviewed.   Constitutional:       Appearance: Normal appearance.   HENT:      Head: Normocephalic.   Cardiovascular:      Rate and Rhythm: Normal rate and regular rhythm.      Heart sounds: Normal heart sounds.   Pulmonary:      Effort: Pulmonary effort is normal.      Breath sounds: Normal breath sounds.   Abdominal:      General: Bowel sounds are normal.      Palpations: Abdomen is soft.   Musculoskeletal:         General: Normal range of motion.      Cervical back: Normal range of motion.   Skin:     General: Skin is warm and dry.      Capillary Refill: Capillary refill takes less than 2 seconds.   Neurological:      Mental Status: He is alert and oriented to person, place, and time.   Psychiatric:         Mood and Affect: Mood normal.         Behavior: Behavior normal.         PERTINENT DATA:  CBC:  WBC   Date Value Ref Range Status   07/09/2024 4.8 4.4 - 11.3 x10*3/uL Final     nRBC   Date Value Ref Range Status   07/09/2024 0.0 0.0 - 0.0 /100 WBCs Final     RBC   Date Value Ref Range Status   07/09/2024 4.38 (L) 4.50 - 5.90 x10*6/uL Final     Hemoglobin   Date Value Ref Range Status   07/09/2024 13.5 13.5 - 17.5 g/dL Final     MCV   Date Value Ref Range Status   07/09/2024 94 80 - 100 fL Final     RDW   Date Value Ref Range Status   07/09/2024 14.3 11.5 - 14.5 % Final       Renal Function Panel:  Glucose   Date Value Ref Range Status   07/09/2024 81 74 - 99 mg/dL Final     Sodium   Date Value Ref Range Status   07/09/2024 139 136 - 145 mmol/L Final     Potassium   Date Value Ref Range Status   07/09/2024 3.9 3.5 - 5.3 mmol/L Final     Chloride   Date Value Ref Range Status   07/09/2024 103 98 - 107 mmol/L Final     Anion Gap   Date Value Ref Range Status   07/09/2024 " 13 10 - 20 mmol/L Final     Urea Nitrogen   Date Value Ref Range Status   07/09/2024 16 6 - 23 mg/dL Final     Creatinine   Date Value Ref Range Status   07/09/2024 1.62 (H) 0.50 - 1.30 mg/dL Final     eGFR   Date Value Ref Range Status   07/09/2024 49 (L) >60 mL/min/1.73m*2 Final     Comment:     Calculations of estimated GFR are performed using the 2021 CKD-EPI Study Refit equation without the race variable for the IDMS-Traceable creatinine methods.  https://jasn.asnjournals.org/content/early/2021/09/22/ASN.0329932226     Calcium   Date Value Ref Range Status   07/09/2024 9.5 8.6 - 10.6 mg/dL Final     Phosphorus   Date Value Ref Range Status   12/30/2022 2.5 2.5 - 4.9 mg/dL Final     Comment:      The performance characteristics of phosphorus testing in   heparinized plasma have been validated by the individual     laboratory site where testing is performed. Testing    on heparinized plasma is not approved by the FDA;    however, such approval is not necessary.       Albumin   Date Value Ref Range Status   07/09/2024 4.6 3.4 - 5.0 g/dL Final       Hepatic Panel:  Albumin   Date Value Ref Range Status   07/09/2024 4.6 3.4 - 5.0 g/dL Final     Bilirubin, Total   Date Value Ref Range Status   07/09/2024 0.4 0.0 - 1.2 mg/dL Final     Alkaline Phosphatase   Date Value Ref Range Status   07/09/2024 51 33 - 120 U/L Final     ALT   Date Value Ref Range Status   07/09/2024 16 10 - 52 U/L Final     Comment:     Patients treated with Sulfasalazine may generate falsely decreased results for ALT.       HIV Viral Load:  HIV-1 RNA PCR Viral Load Log   Date Value Ref Range Status   07/09/2024   Final     Comment:     Not calculated     HIV RNA Result   Date Value Ref Range Status   07/09/2024 Not Detected Not Detected Final       CD4 Count:  CD3+CD4+%   Date Value Ref Range Status   07/09/2024 32 29 - 57 % Final     CD3+CD4+ Absolute   Date Value Ref Range Status   07/09/2024 0.502 0.350 - 2.740 x10E9/L Final     CD3+CD8+%    Date Value Ref Range Status   07/09/2024 38 (H) 7 - 31 % Final     CD3+CD8+ Absolute   Date Value Ref Range Status   07/09/2024 0.597 0.080 - 1.490 x10E9/L Final     CD4/CD8 Ratio   Date Value Ref Range Status   07/09/2024 0.84 (L) 1.00 - 2.60 Final     CD45%   Date Value Ref Range Status   07/11/2023 100 % Final       CRCL:  Creatinine, Urine   Date Value Ref Range Status   10/10/2022 356.0 20.0 - 370.0 mg/dL Final       Lipid Panel:  HDL   Date Value Ref Range Status   07/11/2023 40.1 mg/dL Final     Comment:     .      AGE      VERY LOW   LOW     NORMAL    HIGH       0-19 Y       < 35   < 40     40-45     ----    20-24 Y       ----   < 40       >45     ----      >24 Y       ----   < 40     40-60      >60  .       Cholesterol/HDL Ratio   Date Value Ref Range Status   07/11/2023 5.0  Final     Comment:     REF VALUES  DESIRABLE  < 3.4  HIGH RISK  > 5.0       LDL   Date Value Ref Range Status   07/11/2023 147 (H) 0 - 99 mg/dL Final     Comment:     .                           NEAR      BORD      AGE      DESIRABLE  OPTIMAL    HIGH     HIGH     VERY HIGH     0-19 Y     0 - 109     ---    110-129   >/= 130     ----    20-24 Y     0 - 119     ---    120-159   >/= 160     ----      >24 Y     0 -  99   100-129  130-159   160-189     >/=190  .       VLDL   Date Value Ref Range Status   07/11/2023 15 0 - 40 mg/dL Final     Triglycerides   Date Value Ref Range Status   07/11/2023 74 0 - 149 mg/dL Final     Comment:     .      AGE      DESIRABLE   BORDERLINE HIGH   HIGH     VERY HIGH   0 D-90 D    19 - 174         ----         ----        ----  91 D- 9 Y     0 -  74        75 -  99     >/= 100      ----    10-19 Y     0 -  89        90 - 129     >/= 130      ----    20-24 Y     0 - 114       115 - 149     >/= 150      ----         >24 Y     0 - 149       150 - 199    200- 499    >/= 500  .   Venipuncture immediately after or during the    administration of Metamizole may lead to falsely   low results. Testing should be  "performed immediately   prior to Metamizole dosing.         HgbA1c:  No results found for: \"HGBA1C\"    The 10-year ASCVD risk score (Sohail DK, et al., 2019) is: 11.9%    Values used to calculate the score:      Age: 59 years      Sex: Male      Is Non- : No      Diabetic: No      Tobacco smoker: No      Systolic Blood Pressure: 154 mmHg      Is BP treated: No      HDL Cholesterol: 40.1 mg/dL      Total Cholesterol: 202 mg/dL    ASSESSMENT / PLAN:    Dietician seeing him for weight gain and his lipids.   We will get routine labs and see him again in 6 months.   Problem List Items Addressed This Visit    None  Visit Diagnoses       Human immunodeficiency virus (HIV) disease (Multi)        Relevant Orders    CBC and Auto Differential    CD4/8 Panel    Comprehensive Metabolic Panel    HIV RNA, quantitative, PCR    Syphilis Screen with Reflex        Thank you for coming in to see us today.   Please reach out to us with any questions or concerns.   Have a good new year.    Candy Rice, APRN-CNP      "

## 2025-01-08 LAB
CD3+CD4+ CELLS # BLD: 0.57 X10E9/L
CD3+CD4+ CELLS # BLD: 571 /MM3
CD3+CD4+ CELLS NFR BLD: 29 %
CD3+CD4+ CELLS/CD3+CD8+ CLL BLD: 0.81 %
CD3+CD8+ CELLS # BLD: 0.71 X10E9/L
CD3+CD8+ CELLS NFR BLD: 36 %
HIV1 RNA # PLAS NAA DL=20: NOT DETECTED {COPIES}/ML
HIV1 RNA SPEC NAA+PROBE-LOG#: NORMAL {LOG_COPIES}/ML
LYMPHOCYTES # SPEC AUTO: 1.97 X10*3/UL

## 2025-01-08 NOTE — PROGRESS NOTES
"Nutrition Assessment     Reason for Visit:  Esdras Capone is a 59 y.o. male who was seen today 2/2 NP consult for hypercholesterolemia and low-sodium nutrition therapy education.     Anthropometrics:  Height: 6'6\"  Weight: 246#  BMI: 28.4   Weight change: 14.8# weight gain in the past 6 months.   Weight Hx:   07/09/24: 231.2#   01/09/24: 240#   07/11/24: 236.8#     Food And Nutrient Intake:  Food and Nutrient History  Food and Nutrient History: Pt was seen today 2/2 NP consult for hypercholesterolemia and low-sodium nutrition therapy education. Most recent LDL: 147 7/11/23. BP today: 154/90. Pt also with 14.8# weight gain in the past 6 months. In review of high sodium/high saturated fat items, he noted that he has been using a lot of seasoning salts and butter regularly. He stated that he also enjoys eating chicken and turkey and also buys a variety of fruit and vegetables. He also enjoys soup. Sugar and sources of sugar were reviewed. He stated that he has been adding sugar to coffee and eating apple fritters. When discussing Mrs. Louie, he noted that his BP was lower when he was using this regularly but ran out and has been using regular seasoning salts.     Nutrition Diagnosis   Nutrition Diagnosis  Patient has Nutrition Diagnosis: Yes  Diagnosis Status (1): New  Nutrition Diagnosis 1: Excessive mineral intake  Related to (1): undesirable high sodium seasonings  As Evidenced by (1): pt interview revealing regular intake of seasoning salts; BP: 154/90  Additional Nutrition Diagnosis: Diagnosis 2  Diagnosis Status (2): New  Nutrition Diagnosis 2: Excessive fat intake  Related to (2): undesirable high sat fat food choices  As Evidenced by (2): pt interview revealing regular intake of butter; LDL: 147.    Nutrition Interventions/Recommendations   Food and Nutrition Delivery  Meals & Snacks: Carbohydrate-modified diet, Fat-modified diet, General Healthful Diet, Fiber-modified diet, Mineral-modified diet  Goals: An " "exam room PC was used to educate pt on use of nutrition facts to compare food/beverage options with lesser amounts of sat fat, sodium, and sugar. Servings per container and serving sizes were reviewed. He was encouraged to practice the 5/20% rule to easily determine low vs high sodium/sat fat items. Use of this rule was demonstrated using exam room PC. He was encouraged to resume use of Mrs. Dash or powders to season foods and to forgo use of seasoning salts. He was also encouraged to choose \"low-sodium\" options. Foods inherently low in sodium were also reviewed and recommended. Next, high vs low sat fat foods were discussed. He was encouraged to reduce intake of animal products in general while prioritizing lean cuts. The importance of incorporating plant based foods at main meals and as snacks was emphasized. In review of butter intake, he was amenable to substituting intake with tub margarine / olive oil. Lastly, the importance of minimizing intake of added sugars was discussed and recommended. He was engaged throughout the interaction and demonstrated excellent understanding of all content reviewed.    Nutrition Monitoring and Evaluation   Food/Nutrient Related History Monitoring  Monitoring and Evaluation Plan: Fat intake, Carbohydrate intake, Mineral/element intake  Body Composition/Growth/Weight History  Monitoring and Evaluation Plan: Weight change  Weight Change: Weight loss  Criteria: Current weight loss goal: 5%  Biochemical Data, Medical Tests and Procedures  Monitoring and Evaluation Plan: Lipid profile (blood pressure)    Follow-up   Progress will be assessed in 1 month. The estimated number of remaining follow-ups at this time: 6+ at a frequency of ~Q1 month. The total number and frequency of remaining follow-ups may change depending on patient's progress.     Time spent with patient: 60 minutes of which 50 percent or greater was spent counseling and or coordinating care.               "

## 2025-05-19 DIAGNOSIS — B20 HIV DISEASE (MULTI): ICD-10-CM

## 2025-05-20 RX ORDER — BICTEGRAVIR SODIUM, EMTRICITABINE, AND TENOFOVIR ALAFENAMIDE FUMARATE 50; 200; 25 MG/1; MG/1; MG/1
1 TABLET ORAL DAILY
Qty: 30 TABLET | Refills: 5 | Status: SHIPPED | OUTPATIENT
Start: 2025-05-20

## 2025-07-08 ENCOUNTER — APPOINTMENT (OUTPATIENT)
Dept: IMMUNOLOGY | Facility: CLINIC | Age: 60
End: 2025-07-08
Payer: MEDICARE

## 2025-07-29 ENCOUNTER — OFFICE VISIT (OUTPATIENT)
Dept: URGENT CARE | Age: 60
End: 2025-07-29
Payer: MEDICARE

## 2025-07-29 VITALS
RESPIRATION RATE: 17 BRPM | HEART RATE: 99 BPM | SYSTOLIC BLOOD PRESSURE: 121 MMHG | DIASTOLIC BLOOD PRESSURE: 76 MMHG | TEMPERATURE: 97.8 F | OXYGEN SATURATION: 99 %

## 2025-07-29 DIAGNOSIS — B02.9 HERPES ZOSTER WITHOUT COMPLICATION: Primary | ICD-10-CM

## 2025-07-29 DIAGNOSIS — R42 DIZZINESS: ICD-10-CM

## 2025-07-29 DIAGNOSIS — Z86.69: ICD-10-CM

## 2025-07-29 PROBLEM — G89.4 CHRONIC PAIN SYNDROME: Status: ACTIVE | Noted: 2025-07-29

## 2025-07-29 PROBLEM — Z21 HIV (HUMAN IMMUNODEFICIENCY VIRUS INFECTION): Status: ACTIVE | Noted: 2025-07-29

## 2025-07-29 PROBLEM — B18.2 CHRONIC HEPATITIS C VIRUS INFECTION (MULTI): Status: ACTIVE | Noted: 2023-07-11

## 2025-07-29 PROBLEM — F41.1 GENERALIZED ANXIETY DISORDER: Status: ACTIVE | Noted: 2025-07-29

## 2025-07-29 LAB — POC FINGERSTICK BLOOD GLUCOSE: 119 MG/DL (ref 70–100)

## 2025-07-29 PROCEDURE — 93000 ELECTROCARDIOGRAM COMPLETE: CPT | Performed by: PHYSICIAN ASSISTANT

## 2025-07-29 PROCEDURE — 82962 GLUCOSE BLOOD TEST: CPT | Performed by: PHYSICIAN ASSISTANT

## 2025-07-29 PROCEDURE — 1036F TOBACCO NON-USER: CPT | Performed by: PHYSICIAN ASSISTANT

## 2025-07-29 PROCEDURE — 99204 OFFICE O/P NEW MOD 45 MIN: CPT | Performed by: PHYSICIAN ASSISTANT

## 2025-07-29 RX ORDER — VALACYCLOVIR HYDROCHLORIDE 1 G/1
1000 TABLET, FILM COATED ORAL 2 TIMES DAILY
Qty: 20 TABLET | Refills: 0 | Status: SHIPPED | OUTPATIENT
Start: 2025-07-29 | End: 2025-08-08

## 2025-07-29 ASSESSMENT — ENCOUNTER SYMPTOMS
NECK PAIN: 0
LIGHT-HEADEDNESS: 1
FEVER: 0
WEAKNESS: 0
NECK STIFFNESS: 0
SHORTNESS OF BREATH: 0
FATIGUE: 0
DIZZINESS: 1
ABDOMINAL PAIN: 0
CHILLS: 0
COUGH: 0
HEADACHES: 0
NUMBNESS: 1
VOMITING: 0
EYES NEGATIVE: 1
NAUSEA: 0

## 2025-07-29 ASSESSMENT — PATIENT HEALTH QUESTIONNAIRE - PHQ9
2. FEELING DOWN, DEPRESSED OR HOPELESS: NOT AT ALL
SUM OF ALL RESPONSES TO PHQ9 QUESTIONS 1 AND 2: 0
1. LITTLE INTEREST OR PLEASURE IN DOING THINGS: NOT AT ALL

## 2025-07-29 NOTE — PROGRESS NOTES
"Subjective   Patient ID: Esdras Capone is a 59 y.o. male. They present today with a chief complaint of Other (Numbness, tingling today).    History of Present Illness  -c/o feeling \"lightheaded\" and dizzy while sitting at his desk today at work   -he then felt tingling in his whole body  -the lightheadedness felt like he was spinning, but has since resolved  -he reports he felt \"electric energy around him, and then the computer system was acting up\"  -he then noticed rash on his right chest wall   -denies CP, SOB, fever, chills  -reports compliance with his HIV medications  -b/l PATO has a little bit decreased sensation       History provided by:  Medical records and patient      Past Medical History  Allergies as of 07/29/2025 - Reviewed 07/29/2025   Allergen Reaction Noted    Emtricita-rilpivirine-tenof df Dizziness and Nausea Only 01/09/2024    Ajxnmqbxgo-ymlllbgs-ondipi ala Dizziness and Nausea Only 01/09/2024    Rilpivirine Dizziness and Nausea Only 01/09/2024    Tenofovir disoproxil fumarate Nausea/vomiting 01/09/2024       Prescriptions Prior to Admission[1]     Medical History[2]    Surgical History[3]     reports that he has never smoked. He has never used smokeless tobacco. He reports that he does not currently use alcohol. He reports that he does not use drugs.    Review of Systems  Review of Systems   Constitutional:  Negative for chills, fatigue and fever.   HENT: Negative.     Eyes: Negative.    Respiratory:  Negative for cough and shortness of breath.    Gastrointestinal:  Negative for abdominal pain, nausea and vomiting.   Genitourinary: Negative.    Musculoskeletal:  Negative for neck pain and neck stiffness.   Skin:  Positive for rash.   Neurological:  Positive for dizziness, light-headedness and numbness. Negative for syncope, weakness and headaches.        -SEE HPI   All other systems reviewed and are negative.      Objective    Vitals:    07/29/25 1857 07/29/25 1942 07/29/25 1944 07/29/25 1947 "   BP: 140/82 117/77 118/85 121/76   BP Location:  Left arm Left arm Left arm   Patient Position:  5 min laying 1 minute standing 3 minute standing   BP Cuff Size:  Small adult Small adult Small adult   Pulse: 84 79 99    Resp: 17      Temp: 36.6 °C (97.8 °F)      TempSrc: Oral      SpO2: 99%        No LMP for male patient.    Physical Exam  Vitals and nursing note reviewed.   Constitutional:       General: He is not in acute distress.     Appearance: Normal appearance. He is not ill-appearing.   HENT:      Head: Normocephalic.      Right Ear: Tympanic membrane and ear canal normal.      Left Ear: Tympanic membrane and ear canal normal.      Nose: Nose normal.      Mouth/Throat:      Mouth: Mucous membranes are moist.      Pharynx: Oropharynx is clear. No oropharyngeal exudate or posterior oropharyngeal erythema.     Eyes:      General: Lids are normal.      Extraocular Movements: Extraocular movements intact.      Conjunctiva/sclera: Conjunctivae normal.      Pupils: Pupils are equal, round, and reactive to light.       Cardiovascular:      Rate and Rhythm: Normal rate and regular rhythm.      Pulses: Normal pulses.      Heart sounds: No murmur heard.     No friction rub. No gallop.   Pulmonary:      Effort: Pulmonary effort is normal.      Breath sounds: Normal breath sounds. No wheezing, rhonchi or rales.     Musculoskeletal:         General: Normal range of motion.      Cervical back: Normal range of motion and neck supple. No rigidity or tenderness.     Skin:     General: Skin is warm.      Capillary Refill: Capillary refill takes less than 2 seconds.      Findings: Rash present. Rash is papular and vesicular.          Neurological:      General: No focal deficit present.      Mental Status: He is alert and oriented to person, place, and time.      Motor: Motor function is intact.      Coordination: Coordination is intact.      Gait: Gait is intact.     Psychiatric:         Behavior: Behavior is cooperative.        /76 (BP Location: Left arm, Patient Position: 3 minute standing, BP Cuff Size: Small adult)   Pulse 99   Temp 36.6 °C (97.8 °F) (Oral)   Resp 17   SpO2 99%       Procedures    Point of Care Test & Imaging Results from this visit  Results for orders placed or performed in visit on 07/29/25   POCT fingerstick glucose manually resulted   Result Value Ref Range    POC Fingerstick Blood Glucose 119 (A) 70 - 100 mg/dl      Imaging  No results found.    Cardiology, Vascular, and Other Imaging  No other imaging results found for the past 2 days      Diagnostic study results (if any) were reviewed by Alanis Alvarez PA-C.    Assessment/Plan   Allergies, medications, history, and pertinent labs/EKGs/Imaging reviewed by Alanis Alvarez PA-C.     Medical Decision Making  Patient presented for dizziness/lightheadedness and whole body tingling, followed by a rash.  Lightheadedness resolved. EKG NSR, no evidence of acute ischemia.  He has reported decreased sensation in b/l LE, but its symmetric, otherwise neuro exam is benign.  He has a rash on the right chest wall which is suspicious for possible shingles.  Discussed patient and EKG with supervising physician. 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  His neuro exam today is grossly non-focal, but I instructed him to follow up with his neurologist, and I have placed a referral.  He voiced understanding. I will also start on valtrex for suspected zoster rash.  He was advised to follow up with his HIV/PCP physician.  ER precautions advised for new/worsening symptoms including headache, new/worsening dizziness, difficulty walking, chest pain, SOB, or any other concerning symptoms.  Patient voiced understanding of plan as outlined above. Advised seeking immediate emergency medical attention if symptoms fail to improve, worsen or any concerning symptoms arise.  At time  of discharge patient was clinically well-appearing and HDS for outpatient management. The patient and/or family was educated regarding diagnosis, supportive care, OTC and Rx medications. The patient and/or family was given the opportunity to ask questions prior to discharge.  They verbalized understanding of my discussion of the plans for treatment, expected course, indications to return to  or seek further evaluation in ED, and the need for timely follow up as directed.   They were provided with a work/school excuse if requested.  AVS provided to patient.  All questions were answered and the patient verbalized understanding of the plan of care for today.       We discussed with the patient our clinical thoughts at this time given the above findings and clinical assessment and we had a shared decision-making conversation in a patient-centered decision-making model on how to proceed forward. The patient was instructed on the importance of a close follow-up with Primary Care Provider and other care providers. The patient was also advised that an Urgent care diagnosis is often a preliminary impression and that definitive care is often not able to be given completley in the Urgent care setting.    Orders and Diagnoses  Diagnoses and all orders for this visit:  Herpes zoster without complication  -     Referral to Primary Care; Future  -     valACYclovir (Valtrex) 1 gram tablet; Take 1 tablet (1,000 mg) by mouth 2 times a day for 10 days.  Dizziness  -     ECG 12 lead (Clinic Performed)  -     POCT fingerstick glucose manually resulted  -     Vital Signs  -     Referral to Neurology; Future  History of cerebral cyst  -     Referral to Neurology; Future      Medical Admin Record      Patient disposition: Home    Electronically signed by Alanis Alvarez PA-C  8:11 PM           [1] (Not in a hospital admission)   [2]   Past Medical History:  Diagnosis Date    Asymptomatic human immunodeficiency virus (hiv) infection  status 12/30/2022    HIV positive    Chronic pain syndrome     Pain syndrome, chronic    Chronic viral hepatitis C (Multi) 06/12/2018    Hepatitis C, chronic    Hepatic fibrosis, unspecified 06/12/2018    Liver fibrosis    Human immunodeficiency virus (HIV) disease (Multi) 06/17/2022    AIDS    Immune deficiency disorder (Multi) HIV    Personal history of infections of the central nervous system     History of meningitis    Personal history of other diseases of the digestive system     History of oral hairy leukoplakia    Personal history of other diseases of the nervous system and sense organs     History of Bell's palsy    Personal history of other diseases of urinary system     History of kidney problems    Personal history of other drug therapy 10/16/2019    History of influenza vaccination    Personal history of other endocrine, nutritional and metabolic disease     History of dehydration    Personal history of other infectious and parasitic diseases     History of onychomycosis    Personal history of other specified conditions     History of diarrhea   [3]   Past Surgical History:  Procedure Laterality Date    APPENDECTOMY  03/25/2014    Appendectomy    MR HEAD ANGIO WO IV CONTRAST  2/2/2013    MR HEAD ANGIO WO IV CONTRAST 2/2/2013 [facility] [place] ANCILLARY LEGACY    OTHER SURGICAL HISTORY  02/05/2014    Biopsy Of Liver    US GUIDED NEEDLE LIVER BIOPSY  7/16/2012    US GUIDED NEEDLE LIVER BIOPSY 7/16/2012 [facility] [place] AIB LEGACY

## 2025-07-29 NOTE — PATIENT INSTRUCTIONS
Your rash is possibly due to shingles.  Please take valtrex a prescribed.   You need to schedule appt with neurology and primary care as soon as possible.   You need to schedule an appt with your HIV doctor.   Any worsening symptoms please go to the ER.

## 2025-07-30 ENCOUNTER — DOCUMENTATION (OUTPATIENT)
Dept: IMMUNOLOGY | Facility: CLINIC | Age: 60
End: 2025-07-30
Payer: COMMERCIAL

## 2025-07-30 DIAGNOSIS — B02.8 HERPES ZOSTER WITH OTHER COMPLICATION: Primary | ICD-10-CM

## 2025-07-30 RX ORDER — DIAPER,BRIEF,INFANT-TODD,DISP
EACH MISCELLANEOUS 3 TIMES DAILY
Qty: 3 G | Refills: 1 | Status: SHIPPED | OUTPATIENT
Start: 2025-07-30

## 2025-07-30 RX ORDER — DIAPER,BRIEF,INFANT-TODD,DISP
EACH MISCELLANEOUS 3 TIMES DAILY
Qty: 3 G | Refills: 1 | Status: SHIPPED | OUTPATIENT
Start: 2025-07-30 | End: 2025-07-30

## 2025-07-30 NOTE — PROGRESS NOTES
Spoke with Esdras today after he was seen in Urgent Care.  They told him he had shingles and ordered Valtrex.  We discussed this diagnosis.   He would like to be seen, so will have RN sierra him.    Also ordered hydrocortisone cream for the itch.  Explained that he needed to take the Valtrex as ordered.    When applying cream to rash - wash hands after wards.  Do not touch rash and then another part of his body.  Verbalized  understanding.

## 2025-08-04 ENCOUNTER — OFFICE VISIT (OUTPATIENT)
Dept: IMMUNOLOGY | Facility: CLINIC | Age: 60
End: 2025-08-04
Payer: COMMERCIAL

## 2025-08-04 VITALS
BODY MASS INDEX: 26.76 KG/M2 | HEART RATE: 72 BPM | WEIGHT: 231.6 LBS | SYSTOLIC BLOOD PRESSURE: 133 MMHG | OXYGEN SATURATION: 99 % | DIASTOLIC BLOOD PRESSURE: 85 MMHG

## 2025-08-04 DIAGNOSIS — B20 HIV DISEASE (MULTI): ICD-10-CM

## 2025-08-04 DIAGNOSIS — B20 HUMAN IMMUNODEFICIENCY VIRUS (HIV) DISEASE (MULTI): ICD-10-CM

## 2025-08-04 DIAGNOSIS — B02.8 HERPES ZOSTER WITH OTHER COMPLICATION: ICD-10-CM

## 2025-08-04 LAB
ALBUMIN SERPL BCP-MCNC: 4.5 G/DL (ref 3.4–5)
ALP SERPL-CCNC: 51 U/L (ref 33–120)
ALT SERPL W P-5'-P-CCNC: 11 U/L (ref 10–52)
ANION GAP SERPL CALC-SCNC: 13 MMOL/L (ref 10–20)
AST SERPL W P-5'-P-CCNC: 14 U/L (ref 9–39)
BASOPHILS # BLD AUTO: 0.03 X10*3/UL (ref 0–0.1)
BASOPHILS NFR BLD AUTO: 0.5 %
BILIRUB SERPL-MCNC: 0.4 MG/DL (ref 0–1.2)
BUN SERPL-MCNC: 15 MG/DL (ref 6–23)
CALCIUM SERPL-MCNC: 9.1 MG/DL (ref 8.6–10.6)
CHLORIDE SERPL-SCNC: 103 MMOL/L (ref 98–107)
CO2 SERPL-SCNC: 26 MMOL/L (ref 21–32)
CREAT SERPL-MCNC: 1.3 MG/DL (ref 0.5–1.3)
EGFRCR SERPLBLD CKD-EPI 2021: 64 ML/MIN/1.73M*2
EOSINOPHIL # BLD AUTO: 0.03 X10*3/UL (ref 0–0.7)
EOSINOPHIL NFR BLD AUTO: 0.5 %
ERYTHROCYTE [DISTWIDTH] IN BLOOD BY AUTOMATED COUNT: 13.9 % (ref 11.5–14.5)
GLUCOSE SERPL-MCNC: 72 MG/DL (ref 74–99)
HCT VFR BLD AUTO: 43.1 % (ref 41–52)
HGB BLD-MCNC: 14 G/DL (ref 13.5–17.5)
IMM GRANULOCYTES # BLD AUTO: 0.02 X10*3/UL (ref 0–0.7)
IMM GRANULOCYTES NFR BLD AUTO: 0.3 % (ref 0–0.9)
LYMPHOCYTES # BLD AUTO: 1.84 X10*3/UL (ref 1.2–4.8)
LYMPHOCYTES NFR BLD AUTO: 31.3 %
MCH RBC QN AUTO: 30.8 PG (ref 26–34)
MCHC RBC AUTO-ENTMCNC: 32.5 G/DL (ref 32–36)
MCV RBC AUTO: 95 FL (ref 80–100)
MONOCYTES # BLD AUTO: 0.46 X10*3/UL (ref 0.1–1)
MONOCYTES NFR BLD AUTO: 7.8 %
NEUTROPHILS # BLD AUTO: 3.49 X10*3/UL (ref 1.2–7.7)
NEUTROPHILS NFR BLD AUTO: 59.6 %
NRBC BLD-RTO: 0 /100 WBCS (ref 0–0)
PLATELET # BLD AUTO: 262 X10*3/UL (ref 150–450)
POTASSIUM SERPL-SCNC: 4.1 MMOL/L (ref 3.5–5.3)
PROT SERPL-MCNC: 7.3 G/DL (ref 6.4–8.2)
RBC # BLD AUTO: 4.54 X10*6/UL (ref 4.5–5.9)
SODIUM SERPL-SCNC: 138 MMOL/L (ref 136–145)
TREPONEMA PALLIDUM IGG+IGM AB [PRESENCE] IN SERUM OR PLASMA BY IMMUNOASSAY: NONREACTIVE
WBC # BLD AUTO: 5.9 X10*3/UL (ref 4.4–11.3)

## 2025-08-04 PROCEDURE — 36415 COLL VENOUS BLD VENIPUNCTURE: CPT | Performed by: NURSE PRACTITIONER

## 2025-08-04 PROCEDURE — 80053 COMPREHEN METABOLIC PANEL: CPT | Performed by: NURSE PRACTITIONER

## 2025-08-04 PROCEDURE — 87536 HIV-1 QUANT&REVRSE TRNSCRPJ: CPT | Performed by: NURSE PRACTITIONER

## 2025-08-04 PROCEDURE — 1036F TOBACCO NON-USER: CPT | Performed by: NURSE PRACTITIONER

## 2025-08-04 PROCEDURE — 99214 OFFICE O/P EST MOD 30 MIN: CPT | Performed by: NURSE PRACTITIONER

## 2025-08-04 PROCEDURE — 86780 TREPONEMA PALLIDUM: CPT | Performed by: NURSE PRACTITIONER

## 2025-08-04 PROCEDURE — 85025 COMPLETE CBC W/AUTO DIFF WBC: CPT | Performed by: NURSE PRACTITIONER

## 2025-08-04 PROCEDURE — 88185 FLOWCYTOMETRY/TC ADD-ON: CPT | Performed by: NURSE PRACTITIONER

## 2025-08-04 RX ORDER — DIAPER,BRIEF,INFANT-TODD,DISP
EACH MISCELLANEOUS 3 TIMES DAILY
Qty: 3 G | Refills: 1 | Status: SHIPPED | OUTPATIENT
Start: 2025-08-04

## 2025-08-04 ASSESSMENT — ENCOUNTER SYMPTOMS
HEMATOLOGIC/LYMPHATIC NEGATIVE: 1
RESPIRATORY NEGATIVE: 1
NEUROLOGICAL NEGATIVE: 1
CARDIOVASCULAR NEGATIVE: 1
MUSCULOSKELETAL NEGATIVE: 1
ALLERGIC/IMMUNOLOGIC NEGATIVE: 1
GASTROINTESTINAL NEGATIVE: 1
ENDOCRINE NEGATIVE: 1

## 2025-08-04 ASSESSMENT — PAIN SCALES - GENERAL: PAINLEVEL_OUTOF10: 0-NO PAIN

## 2025-08-04 NOTE — PROGRESS NOTES
HIV Clinic Follow-up Visit:    Esdras Capone was last seen in [facility] on 1/7/2025    Missed antiretroviral doses in last 72 hours? No.    Sexually active? no,   Tobacco use: No No alcohol, no drugs.    SUBJECTIVE: 60 YO male coming in for routine follow up after being seen in the ER for Shingles.  Called and asked for earlier follow up with us.   Shingles was on the R side of his chest at collarbone and below.      Review of Systems  Review of Systems   Constitutional:         He had a day of unusual symptoms on 7/29 - felt like he had been electrocuted.  Tingling in limbs, etc.      HENT: Negative.     Eyes:         He felt his vision was changed on 7/29 as well.  OK now.    Respiratory: Negative.     Cardiovascular: Negative.    Gastrointestinal: Negative.    Endocrine: Negative.    Genitourinary: Negative.    Musculoskeletal: Negative.    Skin:         Healing shingles to R side of chest.    Allergic/Immunologic: Negative.    Neurological: Negative.    Hematological: Negative.    Psychiatric/Behavioral:          Some depression just from coming here - white coat.        CURRENT MEDICATIONS:  Current Medications[1]    PHYSICAL EXAMINATION:  Visit Vitals  /85   Pulse 72   Wt 105 kg (231 lb 9.6 oz)   SpO2 99%   BMI 26.76 kg/m²   Smoking Status Never   BSA 2.4 m²       Physical Exam   Physical Exam  Vitals reviewed.   Constitutional:       Appearance: Normal appearance.   HENT:      Head: Normocephalic.     Cardiovascular:      Rate and Rhythm: Normal rate and regular rhythm.      Pulses: Normal pulses.      Heart sounds: Normal heart sounds.   Pulmonary:      Effort: Pulmonary effort is normal.      Breath sounds: Normal breath sounds.   Abdominal:      Palpations: Abdomen is soft.     Musculoskeletal:         General: Normal range of motion.      Cervical back: Normal range of motion.     Skin:     General: Skin is warm and dry.      Capillary Refill: Capillary refill takes less than 2 seconds.      Comments:  Healing - almost gone- herpes rash - no papules at present, no redness, no pain or itchy per patient.      He will continue to treat with cream, taking valtrex.      Neurological:      Mental Status: He is alert and oriented to person, place, and time.     Psychiatric:         Mood and Affect: Mood normal.         Behavior: Behavior normal.         PERTINENT DATA:  CBC:  WBC   Date Value Ref Range Status   01/07/2025 5.9 4.4 - 11.3 x10*3/uL Final     nRBC   Date Value Ref Range Status   01/07/2025 0.0 0.0 - 0.0 /100 WBCs Final     RBC   Date Value Ref Range Status   01/07/2025 4.66 4.50 - 5.90 x10*6/uL Final     Hemoglobin   Date Value Ref Range Status   01/07/2025 14.4 13.5 - 17.5 g/dL Final     MCV   Date Value Ref Range Status   01/07/2025 94 80 - 100 fL Final     RDW   Date Value Ref Range Status   01/07/2025 14.1 11.5 - 14.5 % Final       Renal Function Panel:  Glucose   Date Value Ref Range Status   01/07/2025 77 74 - 99 mg/dL Final     Sodium   Date Value Ref Range Status   01/07/2025 142 136 - 145 mmol/L Final     Potassium   Date Value Ref Range Status   01/07/2025 4.3 3.5 - 5.3 mmol/L Final     Chloride   Date Value Ref Range Status   01/07/2025 107 98 - 107 mmol/L Final     Anion Gap   Date Value Ref Range Status   01/07/2025 11 10 - 20 mmol/L Final     Urea Nitrogen   Date Value Ref Range Status   01/07/2025 11 6 - 23 mg/dL Final     Creatinine   Date Value Ref Range Status   01/07/2025 1.56 (H) 0.50 - 1.30 mg/dL Final     eGFR   Date Value Ref Range Status   01/07/2025 51 (L) >60 mL/min/1.73m*2 Final     Comment:     Calculations of estimated GFR are performed using the 2021 CKD-EPI Study Refit equation without the race variable for the IDMS-Traceable creatinine methods.  https://jasn.asnjournals.org/content/early/2021/09/22/ASN.4639753982     Calcium   Date Value Ref Range Status   01/07/2025 9.5 8.6 - 10.6 mg/dL Final     Phosphorus   Date Value Ref Range Status   12/30/2022 2.5 2.5 - 4.9 mg/dL Final      Comment:      The performance characteristics of phosphorus testing in   heparinized plasma have been validated by the individual     laboratory site where testing is performed. Testing    on heparinized plasma is not approved by the FDA;    however, such approval is not necessary.       Albumin   Date Value Ref Range Status   01/07/2025 4.3 3.4 - 5.0 g/dL Final       Hepatic Panel:  Albumin   Date Value Ref Range Status   01/07/2025 4.3 3.4 - 5.0 g/dL Final     Bilirubin, Total   Date Value Ref Range Status   01/07/2025 0.5 0.0 - 1.2 mg/dL Final     Alkaline Phosphatase   Date Value Ref Range Status   01/07/2025 56 33 - 120 U/L Final     ALT   Date Value Ref Range Status   01/07/2025 16 10 - 52 U/L Final     Comment:     Patients treated with Sulfasalazine may generate falsely decreased results for ALT.       HIV Viral Load:  HIV-1 RNA PCR Viral Load Log   Date Value Ref Range Status   01/07/2025   Final     Comment:     Not calculated     HIV RNA Result   Date Value Ref Range Status   01/07/2025 Not Detected Not Detected Final       CD4 Count:  CD3+CD4+%   Date Value Ref Range Status   01/07/2025 29 29 - 57 % Final     CD3+CD4+ Absolute   Date Value Ref Range Status   01/07/2025 0.571 0.350 - 2.740 x10E9/L Final     CD3+CD8+%   Date Value Ref Range Status   01/07/2025 36 (H) 7 - 31 % Final     CD3+CD8+ Absolute   Date Value Ref Range Status   01/07/2025 0.709 0.080 - 1.490 x10E9/L Final     CD4/CD8 Ratio   Date Value Ref Range Status   01/07/2025 0.81 (L) 1.00 - 2.60 Final     CD45%   Date Value Ref Range Status   07/11/2023 100 % Final       CRCL:  Creatinine, Urine   Date Value Ref Range Status   10/10/2022 356.0 20.0 - 370.0 mg/dL Final       Lipid Panel:  HDL   Date Value Ref Range Status   07/11/2023 40.1 mg/dL Final     Comment:     .      AGE      VERY LOW   LOW     NORMAL    HIGH       0-19 Y       < 35   < 40     40-45     ----    20-24 Y       ----   < 40       >45     ----      >24 Y       ----    "< 40     40-60      >60  .       Cholesterol/HDL Ratio   Date Value Ref Range Status   07/11/2023 5.0  Final     Comment:     REF VALUES  DESIRABLE  < 3.4  HIGH RISK  > 5.0       LDL   Date Value Ref Range Status   07/11/2023 147 (H) 0 - 99 mg/dL Final     Comment:     .                           NEAR      BORD      AGE      DESIRABLE  OPTIMAL    HIGH     HIGH     VERY HIGH     0-19 Y     0 - 109     ---    110-129   >/= 130     ----    20-24 Y     0 - 119     ---    120-159   >/= 160     ----      >24 Y     0 -  99   100-129  130-159   160-189     >/=190  .       VLDL   Date Value Ref Range Status   07/11/2023 15 0 - 40 mg/dL Final     Triglycerides   Date Value Ref Range Status   07/11/2023 74 0 - 149 mg/dL Final     Comment:     .      AGE      DESIRABLE   BORDERLINE HIGH   HIGH     VERY HIGH   0 D-90 D    19 - 174         ----         ----        ----  91 D- 9 Y     0 -  74        75 -  99     >/= 100      ----    10-19 Y     0 -  89        90 - 129     >/= 130      ----    20-24 Y     0 - 114       115 - 149     >/= 150      ----         >24 Y     0 - 149       150 - 199    200- 499    >/= 500  .   Venipuncture immediately after or during the    administration of Metamizole may lead to falsely   low results. Testing should be performed immediately   prior to Metamizole dosing.         HgbA1c:  No results found for: \"HGBA1C\"    The 10-year ASCVD risk score (Sohail DK, et al., 2019) is: 9.3%    Values used to calculate the score:      Age: 59 years      Clincally relevant sex: Male      Is Non- : No      Diabetic: No      Tobacco smoker: No      Systolic Blood Pressure: 133 mmHg      Is BP treated: No      HDL Cholesterol: 40.1 mg/dL      Total Cholesterol: 202 mg/dL    ASSESSMENT / PLAN:  Getting routine labs today.   Will see him again in 6 months.   Problem List Items Addressed This Visit    None  Visit Diagnoses         Human immunodeficiency virus (HIV) disease (Multi)        Relevant " Orders    CBC and Auto Differential    CD4/8 Panel    Comprehensive Metabolic Panel    HIV RNA, quantitative, PCR      HIV disease (Multi)        Relevant Orders    Syphilis Screen with Reflex    C. trachomatis / N. gonorrhoeae, Amplified, Urogenital      Herpes zoster with other complication        Relevant Medications    hydrocortisone 0.5 % cream        Continue to treat the shingles as you have been.   Stay safe this summer - remember to always hydrate.  See you in 6 months.       Candy Rice, APRN-CNP           [1]   Current Outpatient Medications:     Biktarvy -25 mg tablet, TAKE 1 TABLET BY MOUTH 1 TIME A DAY, Disp: 30 tablet, Rfl: 5    valACYclovir (Valtrex) 1 gram tablet, Take 1 tablet (1,000 mg) by mouth 2 times a day for 10 days., Disp: 20 tablet, Rfl: 0    doxycycline (Vibramycin) 100 mg capsule, Take 2 capsules (200 mg) by mouth if needed (Take 2 pills 1 time within 3 days of an unprotected sexual encounter) for up to 15 doses. Take with at least 8 ounces (large glass) of water, do not lie down for 30 minutes after (Patient not taking: Reported on 7/29/2025), Disp: 30 capsule, Rfl: 0    garlic 1,000 mg capsule, Take by mouth once daily. (Patient not taking: Reported on 7/29/2025), Disp: , Rfl:     hydrocortisone 0.5 % cream, Apply topically 3 times a day., Disp: 3 g, Rfl: 1    omega 3-dha-epa-fish oil (Fish OiL) 1,000 mg (120 mg-180 mg) capsule, Take by mouth. (Patient not taking: Reported on 7/29/2025), Disp: , Rfl:     riboflavin (vitamin B2) 100 mg tablet tablet, Take 1 tablet (100 mg) by mouth once daily. (Patient not taking: Reported on 7/29/2025), Disp: , Rfl:

## 2025-08-05 LAB
CD3+CD4+ CELLS # BLD: 0.61 X10E9/L (ref 0.35–2.74)
CD3+CD4+ CELLS # BLD: 607 /MM3 (ref 350–2740)
CD3+CD4+ CELLS NFR BLD: 33 % (ref 29–57)
CD3+CD4+ CELLS/CD3+CD8+ CLL BLD: 0.83 % (ref 1–3.5)
CD3+CD8+ CELLS # BLD: 0.74 X10E9/L (ref 0.08–1.49)
CD3+CD8+ CELLS NFR BLD: 40 % (ref 7–31)
HIV1 RNA # PLAS NAA DL=20: NOT DETECTED {COPIES}/ML
HIV1 RNA SPEC NAA+PROBE-LOG#: NORMAL {LOG_COPIES}/ML
LYMPHOCYTES # SPEC AUTO: 1.84 X10*3/UL

## 2025-08-20 ENCOUNTER — APPOINTMENT (OUTPATIENT)
Dept: RADIOLOGY | Facility: HOSPITAL | Age: 60
End: 2025-08-20
Payer: MEDICARE

## 2025-08-20 ENCOUNTER — CLINICAL SUPPORT (OUTPATIENT)
Dept: EMERGENCY MEDICINE | Facility: HOSPITAL | Age: 60
End: 2025-08-20
Payer: MEDICARE

## 2025-08-20 ENCOUNTER — HOSPITAL ENCOUNTER (EMERGENCY)
Facility: HOSPITAL | Age: 60
Discharge: HOME | End: 2025-08-20
Attending: STUDENT IN AN ORGANIZED HEALTH CARE EDUCATION/TRAINING PROGRAM
Payer: MEDICARE

## 2025-08-20 VITALS
HEIGHT: 78 IN | HEART RATE: 75 BPM | RESPIRATION RATE: 18 BRPM | OXYGEN SATURATION: 100 % | WEIGHT: 242 LBS | TEMPERATURE: 98.6 F | SYSTOLIC BLOOD PRESSURE: 181 MMHG | BODY MASS INDEX: 28 KG/M2 | DIASTOLIC BLOOD PRESSURE: 80 MMHG

## 2025-08-20 DIAGNOSIS — H81.10 BENIGN PAROXYSMAL POSITIONAL VERTIGO, UNSPECIFIED LATERALITY: Primary | ICD-10-CM

## 2025-08-20 PROBLEM — Q04.6 COLLOID CYST OF THIRD VENTRICLE (MULTI): Status: ACTIVE | Noted: 2021-03-28

## 2025-08-20 PROBLEM — D33.2 BENIGN NEOPLASM OF BRAIN (MULTI): Status: ACTIVE | Noted: 2022-10-20

## 2025-08-20 PROBLEM — R03.0 FINDING OF ABOVE NORMAL BLOOD PRESSURE: Status: ACTIVE | Noted: 2023-01-10

## 2025-08-20 PROBLEM — G43.909 MIGRAINE HEADACHE: Status: ACTIVE | Noted: 2025-08-20

## 2025-08-20 PROBLEM — B20 ACQUIRED IMMUNODEFICIENCY SYNDROME (MULTI): Status: ACTIVE | Noted: 2025-08-20

## 2025-08-20 LAB
ALBUMIN SERPL BCP-MCNC: 4.5 G/DL (ref 3.4–5)
ALP SERPL-CCNC: 46 U/L (ref 33–120)
ALT SERPL W P-5'-P-CCNC: 11 U/L (ref 10–52)
ANION GAP SERPL CALC-SCNC: 12 MMOL/L (ref 10–20)
AST SERPL W P-5'-P-CCNC: 26 U/L (ref 9–39)
ATRIAL RATE: 62 BPM
BASOPHILS # BLD AUTO: 0.03 X10*3/UL (ref 0–0.1)
BASOPHILS NFR BLD AUTO: 0.4 %
BILIRUB SERPL-MCNC: 0.6 MG/DL (ref 0–1.2)
BUN SERPL-MCNC: 10 MG/DL (ref 6–23)
CALCIUM SERPL-MCNC: 9 MG/DL (ref 8.6–10.6)
CARDIAC TROPONIN I PNL SERPL HS: 3 NG/L (ref 0–53)
CHLORIDE SERPL-SCNC: 102 MMOL/L (ref 98–107)
CO2 SERPL-SCNC: 26 MMOL/L (ref 21–32)
CREAT SERPL-MCNC: 1.29 MG/DL (ref 0.5–1.3)
EGFRCR SERPLBLD CKD-EPI 2021: 64 ML/MIN/1.73M*2
EOSINOPHIL # BLD AUTO: 0.01 X10*3/UL (ref 0–0.7)
EOSINOPHIL NFR BLD AUTO: 0.1 %
ERYTHROCYTE [DISTWIDTH] IN BLOOD BY AUTOMATED COUNT: 13.8 % (ref 11.5–14.5)
GLUCOSE SERPL-MCNC: 100 MG/DL (ref 74–99)
HCT VFR BLD AUTO: 43.6 % (ref 41–52)
HGB BLD-MCNC: 15 G/DL (ref 13.5–17.5)
IMM GRANULOCYTES # BLD AUTO: 0.02 X10*3/UL (ref 0–0.7)
IMM GRANULOCYTES NFR BLD AUTO: 0.2 % (ref 0–0.9)
LYMPHOCYTES # BLD AUTO: 1.27 X10*3/UL (ref 1.2–4.8)
LYMPHOCYTES NFR BLD AUTO: 15.5 %
MAGNESIUM SERPL-MCNC: 2.33 MG/DL (ref 1.6–2.4)
MCH RBC QN AUTO: 29.9 PG (ref 26–34)
MCHC RBC AUTO-ENTMCNC: 34.4 G/DL (ref 32–36)
MCV RBC AUTO: 87 FL (ref 80–100)
MONOCYTES # BLD AUTO: 0.45 X10*3/UL (ref 0.1–1)
MONOCYTES NFR BLD AUTO: 5.5 %
NEUTROPHILS # BLD AUTO: 6.39 X10*3/UL (ref 1.2–7.7)
NEUTROPHILS NFR BLD AUTO: 78.3 %
NRBC BLD-RTO: 0 /100 WBCS (ref 0–0)
P AXIS: 63 DEGREES
P OFFSET: 174 MS
P ONSET: 121 MS
PLATELET # BLD AUTO: 190 X10*3/UL (ref 150–450)
POTASSIUM SERPL-SCNC: 5 MMOL/L (ref 3.5–5.3)
PR INTERVAL: 196 MS
PROT SERPL-MCNC: 8 G/DL (ref 6.4–8.2)
Q ONSET: 219 MS
QRS COUNT: 10 BEATS
QRS DURATION: 86 MS
QT INTERVAL: 374 MS
QTC CALCULATION(BAZETT): 379 MS
QTC FREDERICIA: 378 MS
R AXIS: 48 DEGREES
RBC # BLD AUTO: 5.01 X10*6/UL (ref 4.5–5.9)
SODIUM SERPL-SCNC: 135 MMOL/L (ref 136–145)
T AXIS: 49 DEGREES
T OFFSET: 406 MS
VENTRICULAR RATE: 62 BPM
WBC # BLD AUTO: 8.2 X10*3/UL (ref 4.4–11.3)

## 2025-08-20 PROCEDURE — 96374 THER/PROPH/DIAG INJ IV PUSH: CPT

## 2025-08-20 PROCEDURE — 84484 ASSAY OF TROPONIN QUANT: CPT

## 2025-08-20 PROCEDURE — 2500000002 HC RX 250 W HCPCS SELF ADMINISTERED DRUGS (ALT 637 FOR MEDICARE OP, ALT 636 FOR OP/ED)

## 2025-08-20 PROCEDURE — 96361 HYDRATE IV INFUSION ADD-ON: CPT

## 2025-08-20 PROCEDURE — 84075 ASSAY ALKALINE PHOSPHATASE: CPT

## 2025-08-20 PROCEDURE — 36415 COLL VENOUS BLD VENIPUNCTURE: CPT

## 2025-08-20 PROCEDURE — 70450 CT HEAD/BRAIN W/O DYE: CPT | Performed by: RADIOLOGY

## 2025-08-20 PROCEDURE — 83735 ASSAY OF MAGNESIUM: CPT

## 2025-08-20 PROCEDURE — 93005 ELECTROCARDIOGRAM TRACING: CPT

## 2025-08-20 PROCEDURE — 2500000004 HC RX 250 GENERAL PHARMACY W/ HCPCS (ALT 636 FOR OP/ED)

## 2025-08-20 PROCEDURE — 70450 CT HEAD/BRAIN W/O DYE: CPT

## 2025-08-20 PROCEDURE — 85025 COMPLETE CBC W/AUTO DIFF WBC: CPT

## 2025-08-20 PROCEDURE — 99285 EMERGENCY DEPT VISIT HI MDM: CPT | Mod: 25 | Performed by: STUDENT IN AN ORGANIZED HEALTH CARE EDUCATION/TRAINING PROGRAM

## 2025-08-20 RX ORDER — ONDANSETRON HYDROCHLORIDE 2 MG/ML
4 INJECTION, SOLUTION INTRAVENOUS ONCE
Status: COMPLETED | OUTPATIENT
Start: 2025-08-20 | End: 2025-08-20

## 2025-08-20 RX ORDER — ONDANSETRON 4 MG/1
4 TABLET, ORALLY DISINTEGRATING ORAL EVERY 8 HOURS PRN
Qty: 9 TABLET | Refills: 0 | Status: SHIPPED | OUTPATIENT
Start: 2025-08-20 | End: 2025-08-23

## 2025-08-20 RX ORDER — MECLIZINE HCL 12.5 MG 12.5 MG/1
25 TABLET ORAL ONCE
Status: COMPLETED | OUTPATIENT
Start: 2025-08-20 | End: 2025-08-20

## 2025-08-20 RX ORDER — MECLIZINE HYDROCHLORIDE 25 MG/1
25 TABLET ORAL 3 TIMES DAILY PRN
Qty: 21 TABLET | Refills: 0 | Status: SHIPPED | OUTPATIENT
Start: 2025-08-20 | End: 2025-08-27

## 2025-08-20 RX ADMIN — MECLIZINE 25 MG: 12.5 TABLET ORAL at 11:56

## 2025-08-20 RX ADMIN — SODIUM CHLORIDE, SODIUM LACTATE, POTASSIUM CHLORIDE, AND CALCIUM CHLORIDE 1000 ML: .6; .31; .03; .02 INJECTION, SOLUTION INTRAVENOUS at 11:56

## 2025-08-20 RX ADMIN — ONDANSETRON 4 MG: 2 INJECTION INTRAMUSCULAR; INTRAVENOUS at 11:56

## 2025-08-20 ASSESSMENT — PAIN SCALES - GENERAL: PAINLEVEL_OUTOF10: 5 - MODERATE PAIN

## 2025-08-20 ASSESSMENT — PAIN - FUNCTIONAL ASSESSMENT: PAIN_FUNCTIONAL_ASSESSMENT: 0-10

## 2025-08-25 DIAGNOSIS — Z00.00 HEALTH CARE MAINTENANCE: ICD-10-CM

## 2025-08-25 LAB
ATRIAL RATE: 70 BPM
P AXIS: 72 DEGREES
P OFFSET: 177 MS
P ONSET: 125 MS
PR INTERVAL: 188 MS
Q ONSET: 219 MS
QRS COUNT: 12 BEATS
QRS DURATION: 88 MS
QT INTERVAL: 374 MS
QTC CALCULATION(BAZETT): 403 MS
QTC FREDERICIA: 393 MS
R AXIS: 61 DEGREES
T AXIS: 64 DEGREES
T OFFSET: 406 MS
VENTRICULAR RATE: 70 BPM

## 2025-08-25 RX ORDER — RIBOFLAVIN (VITAMIN B2) 100 MG
100 TABLET ORAL DAILY
Qty: 20 TABLET | Refills: 5 | Status: SHIPPED | OUTPATIENT
Start: 2025-08-25